# Patient Record
Sex: FEMALE | Race: BLACK OR AFRICAN AMERICAN | NOT HISPANIC OR LATINO | Employment: OTHER | URBAN - METROPOLITAN AREA
[De-identification: names, ages, dates, MRNs, and addresses within clinical notes are randomized per-mention and may not be internally consistent; named-entity substitution may affect disease eponyms.]

---

## 2017-04-03 ENCOUNTER — TELEPHONE (OUTPATIENT)
Dept: INTERNAL MEDICINE | Facility: CLINIC | Age: 67
End: 2017-04-03

## 2017-04-03 NOTE — TELEPHONE ENCOUNTER
Panel Management Review      Patient has the following on her problem list: None      Composite cancer screening  Chart review shows that this patient is due/due soon for the following Pap Smear and Mammogram  Summary:    Patient is due/failing the following:   MAMMOGRAM, PAP and PHYSICAL    Action needed:   Patient needs office visit for Annual Px.    Type of outreach:    Phone, left message for patient to call back.     Questions for provider review:    None                                                                                                                                    Alice Dickens MA       Chart routed to none .

## 2017-08-18 ENCOUNTER — RADIANT APPOINTMENT (OUTPATIENT)
Dept: GENERAL RADIOLOGY | Facility: CLINIC | Age: 67
End: 2017-08-18
Attending: NURSE PRACTITIONER

## 2017-08-18 ENCOUNTER — OFFICE VISIT (OUTPATIENT)
Dept: INTERNAL MEDICINE | Facility: CLINIC | Age: 67
End: 2017-08-18

## 2017-08-18 VITALS
TEMPERATURE: 97.9 F | WEIGHT: 194.6 LBS | SYSTOLIC BLOOD PRESSURE: 150 MMHG | HEART RATE: 67 BPM | HEIGHT: 68 IN | OXYGEN SATURATION: 97 % | BODY MASS INDEX: 29.49 KG/M2 | DIASTOLIC BLOOD PRESSURE: 70 MMHG

## 2017-08-18 DIAGNOSIS — M54.41 ACUTE BILATERAL LOW BACK PAIN WITH RIGHT-SIDED SCIATICA: ICD-10-CM

## 2017-08-18 DIAGNOSIS — M70.61 TROCHANTERIC BURSITIS OF RIGHT HIP: ICD-10-CM

## 2017-08-18 DIAGNOSIS — M54.41 ACUTE BILATERAL LOW BACK PAIN WITH RIGHT-SIDED SCIATICA: Primary | ICD-10-CM

## 2017-08-18 PROCEDURE — 72100 X-RAY EXAM L-S SPINE 2/3 VWS: CPT

## 2017-08-18 PROCEDURE — 99213 OFFICE O/P EST LOW 20 MIN: CPT | Performed by: NURSE PRACTITIONER

## 2017-08-18 RX ORDER — CYCLOBENZAPRINE HCL 10 MG
5-10 TABLET ORAL 3 TIMES DAILY PRN
Qty: 30 TABLET | Refills: 1 | Status: SHIPPED | OUTPATIENT
Start: 2017-08-18 | End: 2017-12-15

## 2017-08-18 RX ORDER — PREDNISONE 10 MG/1
TABLET ORAL
Qty: 18 TABLET | Refills: 0 | Status: SHIPPED | OUTPATIENT
Start: 2017-08-18 | End: 2017-12-15

## 2017-08-18 RX ORDER — HYDROCODONE BITARTRATE AND ACETAMINOPHEN 5; 325 MG/1; MG/1
1-2 TABLET ORAL EVERY 8 HOURS PRN
Qty: 15 TABLET | Refills: 0 | Status: SHIPPED | OUTPATIENT
Start: 2017-08-18 | End: 2017-12-15

## 2017-08-18 NOTE — PATIENT INSTRUCTIONS
X ray in [address]    Flexeril at bedtime for muscle relaxer   Do not drive for 5-6 hour after taking medication     Prednisone  take in morning with food   Take 3 tabs daily for 3 days  Take 2 tabs daily for 3 days   Take 1 tab daily for 3 days   Then stop    Vicodin every 8 hours if needed  for severe pain

## 2017-08-18 NOTE — MR AVS SNAPSHOT
After Visit Summary   8/18/2017    Concepcion Schumacher    MRN: [id number]           Patient Information     Date Of Birth          1950        Visit Information        Provider Department      8/18/2017 2:45 PM Diana Phillips APRN CNP; LANGUAGE [facility]        Today's Diagnoses     Acute bilateral low back pain with right-sided sciatica    -  1    Trochanteric bursitis of right hip          Care Instructions    X ray in [address]    Flexeril at bedtime for muscle relaxer   Do not drive for 5-6 hour after taking medication     Prednisone  take in morning with food   Take 3 tabs daily for 3 days  Take 2 tabs daily for 3 days   Take 1 tab daily for 3 days   Then stop    Vicodin every 8 hours if needed  for severe pain           Follow-ups after your visit        Future tests that were ordered for you today     Open Future Orders        Priority Expected Expires Ordered    XR Lumbar Spine 2/3 Views Routine 8/18/2017 8/18/2018 8/18/2017            Who to contact     If you have questions or need follow up information about today's clinic visit or your schedule please contact [facility] directly at [phone].  Normal or non-critical lab and imaging results will be communicated to you by [facility], letter or phone within 4 business days after the clinic has received the results. If you do not hear from us within 7 days, please contact the clinic through [employer] [facility] or phone. If you have a critical or abnormal lab result, we will notify you by phone as soon as possible.  Submit refill requests through [facility] or call your pharmacy and they will forward the refill request to us. Please allow 3 business days for your refill to be completed.          Additional Information About Your Visit        [facility] Information     [facility] lets you send messages to your doctor, view your test results, renew your prescriptions, schedule appointments and more. To sign up, go to  [email or web address] . Click on \"Log in\" on the left side of the screen, which will take you to the Welcome page. Then click on \"Sign up Now\" on the right side of the page.     You will be asked to enter the access code listed below, as well as some personal information. Please follow the directions to create your username and password.     Your access code is: [email or web address]  Expires: 2017  3:46 PM     Your access code will  in 90 days. If you need help or a new code, please call your [facility] clinic or [phone].        Care EveryWhere ID     This is your Care EveryWhere ID. This could be used by other organizations to access your [facility] medical records  [id number]        Your Vitals Were     Pulse Temperature Height Pulse Oximetry BMI (Body Mass Index)       67 97.9  F (36.6  C) (Oral) 5' 8\" (1.727 m) 97% 29.59 kg/m2        Blood Pressure from Last 3 Encounters:   17 150/70   16 156/81   16 145/75    Weight from Last 3 Encounters:   17 194 lb 9.6 oz (88.3 kg)   16 195 lb (88.5 kg)   16 196 lb (88.9 kg)                 Today's Medication Changes          These changes are accurate as of: 17  3:46 PM.  If you have any questions, ask your nurse or doctor.               Start taking these medicines.        Dose/Directions    cyclobenzaprine 10 MG tablet   Commonly known as:  FLEXERIL   Used for:  Acute bilateral low back pain with right-sided sciatica   Started by:  Diana Phillips APRN CNP        Dose:  5-10 mg   Take 0.5-1 tablets (5-10 mg) by mouth 3 times daily as needed for muscle spasms   Quantity:  30 tablet   Refills:  1       HYDROcodone-acetaminophen 5-325 MG per tablet   Commonly known as:  NORCO   Used for:  Acute bilateral low back pain with right-sided sciatica, Trochanteric bursitis of right hip   Started by:  Diana Phillips APRN CNP        Dose:  1-2 tablet   Take 1-2 tablets by mouth every 8 hours as needed for moderate to severe " pain   Quantity:  15 tablet   Refills:  0       predniSONE 10 MG tablet   Commonly known as:  DELTASONE   Used for:  Acute bilateral low back pain with right-sided sciatica, Trochanteric bursitis of right hip   Started by:  Diana Phillips APRN CNP        Take 3 tabs daily for 3 days Take 2 tabs daily for 3 days  Take 1 tab daily for 3 days  Then stop   Quantity:  18 tablet   Refills:  0            Where to get your medicines      These medications were sent to [place] [address] [place] [facility] [address] [place] [address] [address] [facility] [address] [facility] [address]     Phone:  [phone]     cyclobenzaprine 10 MG tablet         Some of these will need a paper prescription and others can be bought over the counter.  Ask your nurse if you have questions.     Bring a paper prescription for each of these medications     HYDROcodone-acetaminophen 5-325 MG per tablet    predniSONE 10 MG tablet                Primary Care Provider Office Phone # Fax #    HOLLY Sylvester [phone]       [address] [address]        Equal Access to Services     [facility]: Hadii sri ku hadasho Soomaali, waaxda luqadaha, qaybta kaalmada adeegyada, waxdominguez ornelas . So [facility] [place] [phone].    ATENCIÓN: Si habla español, tiene a chan disposición servicios gratuitos de asistencia lingüística. Llame al [phone].    We comply with applicable federal civil rights laws and [place] laws. We do not discriminate on the basis of race, color, national origin, age, disability sex, sexual orientation or gender identity.            Thank you!     Thank you for choosing [facility]  for your care. Our goal is always to provide you with excellent care. Hearing back from our patients is one way we can continue to improve our services. Please take a few minutes to complete the written survey that you may receive in the mail after your visit with us. Thank  you!             Your Updated Medication List - Protect others around you: Learn how to safely use, store and throw away your medicines at www.disposemymeds.org.          This list is accurate as of: 8/18/17  3:46 PM.  Always use your most recent med list.                   Brand Name Dispense Instructions for use Diagnosis    cyclobenzaprine 10 MG tablet    FLEXERIL    30 tablet    Take 0.5-1 tablets (5-10 mg) by mouth 3 times daily as needed for muscle spasms    Acute bilateral low back pain with right-sided sciatica       HYDROcodone-acetaminophen 5-325 MG per tablet    NORCO    15 tablet    Take 1-2 tablets by mouth every 8 hours as needed for moderate to severe pain    Acute bilateral low back pain with right-sided sciatica, Trochanteric bursitis of right hip       predniSONE 10 MG tablet    DELTASONE    18 tablet    Take 3 tabs daily for 3 days Take 2 tabs daily for 3 days  Take 1 tab daily for 3 days  Then stop    Acute bilateral low back pain with right-sided sciatica, Trochanteric bursitis of right hip

## 2017-08-18 NOTE — NURSING NOTE
"Chief Complaint   Patient presents with     Musculoskeletal Problem       Initial /70  Pulse 67  Temp 97.9  F (36.6  C) (Oral)  Ht 5' 8\" (1.727 m)  Wt 194 lb 9.6 oz (88.3 kg)  SpO2 97%  BMI 29.59 kg/m2 Estimated body mass index is 29.59 kg/(m^2) as calculated from the following:    Height as of this encounter: 5' 8\" (1.727 m).    Weight as of this encounter: 194 lb 9.6 oz (88.3 kg).  Medication Reconciliation: complete    "

## 2017-08-18 NOTE — PROGRESS NOTES
"  SUBJECTIVE:   Concepcion Schumacher is a 67 year old female who presents to clinic today for the following health issues:      Patient here with right foot pain for two week.  Daughter here and  for her   Patient does need  to be seen but provider feels she does need  as daughter has to explain much of questions and answers         Problem list and histories reviewed & adjusted, as indicated.  Additional history:     Patient Active Problem List   Diagnosis     Hypercalcemia     Primary hyperparathyroidism (H)     Hyperparathyroidism (H)     CARDIOVASCULAR SCREENING; LDL GOAL LESS THAN 160     Mantoux: positive     Nausea and vomiting     Past Surgical History:   Procedure Laterality Date     PARATHYROIDECTOMY  7/3/2012    Procedure: PARATHYROIDECTOMY;  Neck exploration with Excision parathyroid adenoma, with rapid PTH assay, preoperative sestimeby injection;  Surgeon: Jaclyn Sanchez MD;  Location:  OR       Social History   Substance Use Topics     Smoking status: Never Smoker     Smokeless tobacco: Never Used     Alcohol use No     Family History   Problem Relation Age of Onset     Hypertension Sister      Family History Negative Mother      Family History Negative Father              Reviewed and updated as needed this visit by clinical staffTobacco  Allergies  Meds  Problems  Med Hx  Surg Hx  Fam Hx  Soc Hx        Reviewed and updated as needed this visit by Provider         ROS:  Constitutional, HEENT, cardiovascular, pulmonary, GI, , musculoskeletal, neuro, skin, endocrine and psych systems are negative, except as otherwise noted.      OBJECTIVE:   /70  Pulse 67  Temp 97.9  F (36.6  C) (Oral)  Ht 5' 8\" (1.727 m)  Wt 194 lb 9.6 oz (88.3 kg)  SpO2 97%  BMI 29.59 kg/m2  Body mass index is 29.59 kg/(m^2).  GENERAL: alert and mild distress with back to foot pain ; here with daughter who interprets- she does need    RESP: lungs clear  CV: regular rate " and rhythm  MS: no gross musculoskeletal defects noted, no edema  Pain in lower lumbar area - paraspinal muscle tight   Also has pain with compression trochanteric bursa and causes pain around hip and down leg   Antalgic gait   NEURO: Normal strength and tone- limping with walk , mentation intact and speech normal  PSYCH: mentation appears normal, affect normal/bright    Diagnostic Test Results:  X ray     ASSESSMENT/PLAN:             1. Acute bilateral low back pain with right-sided sciatica    - XR Lumbar Spine 2/3 Views; Future  - predniSONE (DELTASONE) 10 MG tablet; Take 3 tabs daily for 3 days  Take 2 tabs daily for 3 days   Take 1 tab daily for 3 days   Then stop  Dispense: 18 tablet; Refill: 0  - cyclobenzaprine (FLEXERIL) 10 MG tablet; Take 0.5-1 tablets (5-10 mg) by mouth 3 times daily as needed for muscle spasms  Dispense: 30 tablet; Refill: 1  - HYDROcodone-acetaminophen (NORCO) 5-325 MG per tablet; Take 1-2 tablets by mouth every 8 hours as needed for moderate to severe pain  Dispense: 15 tablet; Refill: 0    2. Trochanteric bursitis of right hip    - XR Lumbar Spine 2/3 Views; Future  - predniSONE (DELTASONE) 10 MG tablet; Take 3 tabs daily for 3 days  Take 2 tabs daily for 3 days   Take 1 tab daily for 3 days   Then stop  Dispense: 18 tablet; Refill: 0  - HYDROcodone-acetaminophen (NORCO) 5-325 MG per tablet; Take 1-2 tablets by mouth every 8 hours as needed for moderate to severe pain  Dispense: 15 tablet; Refill: 0    Patient Instructions   X ray in [address]    Flexeril at bedtime for muscle relaxer   Do not drive for 5-6 hour after taking medication     Prednisone  take in morning with food   Take 3 tabs daily for 3 days  Take 2 tabs daily for 3 days   Take 1 tab daily for 3 days   Then stop    Vicodin every 8 hours if needed  for severe pain       HOLLY Sylvester [facility]

## 2017-10-15 ENCOUNTER — HEALTH MAINTENANCE LETTER (OUTPATIENT)
Age: 67
End: 2017-10-15

## 2017-12-15 ENCOUNTER — TRANSFERRED RECORDS (OUTPATIENT)
Dept: HEALTH INFORMATION MANAGEMENT | Facility: CLINIC | Age: 67
End: 2017-12-15

## 2017-12-15 ENCOUNTER — OFFICE VISIT (OUTPATIENT)
Dept: INTERNAL MEDICINE | Facility: CLINIC | Age: 67
End: 2017-12-15

## 2017-12-15 ENCOUNTER — HOSPITAL ENCOUNTER (OUTPATIENT)
Dept: GENERAL RADIOLOGY | Facility: CLINIC | Age: 67
Discharge: HOME | End: 2017-12-15
Attending: NURSE PRACTITIONER | Admitting: NURSE PRACTITIONER

## 2017-12-15 VITALS
DIASTOLIC BLOOD PRESSURE: 58 MMHG | BODY MASS INDEX: 29.81 KG/M2 | OXYGEN SATURATION: 98 % | WEIGHT: 196.7 LBS | SYSTOLIC BLOOD PRESSURE: 110 MMHG | TEMPERATURE: 98.1 F | HEART RATE: 97 BPM | HEIGHT: 68 IN

## 2017-12-15 DIAGNOSIS — M54.50 LEFT-SIDED LOW BACK PAIN WITHOUT SCIATICA, UNSPECIFIED CHRONICITY: ICD-10-CM

## 2017-12-15 DIAGNOSIS — R04.2 COUGHING BLOOD: ICD-10-CM

## 2017-12-15 DIAGNOSIS — J06.9 UPPER RESPIRATORY TRACT INFECTION, UNSPECIFIED TYPE: ICD-10-CM

## 2017-12-15 DIAGNOSIS — R82.90 NONSPECIFIC FINDING ON EXAMINATION OF URINE: Primary | ICD-10-CM

## 2017-12-15 DIAGNOSIS — R05.9 COUGH: ICD-10-CM

## 2017-12-15 DIAGNOSIS — R06.89 DECREASED BREATH SOUNDS: ICD-10-CM

## 2017-12-15 DIAGNOSIS — R05.9 COUGH: Primary | ICD-10-CM

## 2017-12-15 DIAGNOSIS — M79.645 PAIN OF FINGER OF LEFT HAND: ICD-10-CM

## 2017-12-15 DIAGNOSIS — S46.812A TRAPEZIUS STRAIN, LEFT, INITIAL ENCOUNTER: ICD-10-CM

## 2017-12-15 LAB
ALBUMIN UR-MCNC: 30 MG/DL
APPEARANCE UR: ABNORMAL
BACTERIA #/AREA URNS HPF: ABNORMAL /HPF
BASOPHILS # BLD AUTO: 0 10E9/L (ref 0–0.2)
BASOPHILS NFR BLD AUTO: 0.2 %
BILIRUB UR QL STRIP: NEGATIVE
COLOR UR AUTO: YELLOW
DIFFERENTIAL METHOD BLD: NORMAL
EOSINOPHIL # BLD AUTO: 0.1 10E9/L (ref 0–0.7)
EOSINOPHIL NFR BLD AUTO: 1.1 %
ERYTHROCYTE [DISTWIDTH] IN BLOOD BY AUTOMATED COUNT: 13.6 % (ref 10–15)
GLUCOSE UR STRIP-MCNC: NEGATIVE MG/DL
HCT VFR BLD AUTO: 42.4 % (ref 35–47)
HGB BLD-MCNC: 13.6 G/DL (ref 11.7–15.7)
HGB UR QL STRIP: ABNORMAL
KETONES UR STRIP-MCNC: 15 MG/DL
LEUKOCYTE ESTERASE UR QL STRIP: ABNORMAL
LYMPHOCYTES # BLD AUTO: 2.7 10E9/L (ref 0.8–5.3)
LYMPHOCYTES NFR BLD AUTO: 29.3 %
MCH RBC QN AUTO: 27.8 PG (ref 26.5–33)
MCHC RBC AUTO-ENTMCNC: 32.1 G/DL (ref 31.5–36.5)
MCV RBC AUTO: 87 FL (ref 78–100)
MONOCYTES # BLD AUTO: 0.8 10E9/L (ref 0–1.3)
MONOCYTES NFR BLD AUTO: 8.1 %
NEUTROPHILS # BLD AUTO: 5.7 10E9/L (ref 1.6–8.3)
NEUTROPHILS NFR BLD AUTO: 61.3 %
NITRATE UR QL: NEGATIVE
NON-SQ EPI CELLS #/AREA URNS LPF: ABNORMAL /LPF
PH UR STRIP: 6.5 PH (ref 5–7)
PLATELET # BLD AUTO: 248 10E9/L (ref 150–450)
RBC # BLD AUTO: 4.89 10E12/L (ref 3.8–5.2)
RBC #/AREA URNS AUTO: ABNORMAL /HPF
SOURCE: ABNORMAL
SP GR UR STRIP: 1.02 (ref 1–1.03)
UROBILINOGEN UR STRIP-ACNC: 0.2 EU/DL (ref 0.2–1)
WBC # BLD AUTO: 9.3 10E9/L (ref 4–11)
WBC #/AREA URNS AUTO: >100 /HPF

## 2017-12-15 PROCEDURE — 80053 COMPREHEN METABOLIC PANEL: CPT | Performed by: NURSE PRACTITIONER

## 2017-12-15 PROCEDURE — 36415 COLL VENOUS BLD VENIPUNCTURE: CPT | Performed by: NURSE PRACTITIONER

## 2017-12-15 PROCEDURE — 81001 URINALYSIS AUTO W/SCOPE: CPT | Performed by: NURSE PRACTITIONER

## 2017-12-15 PROCEDURE — 86480 TB TEST CELL IMMUN MEASURE: CPT | Performed by: NURSE PRACTITIONER

## 2017-12-15 PROCEDURE — 85025 COMPLETE CBC W/AUTO DIFF WBC: CPT | Performed by: NURSE PRACTITIONER

## 2017-12-15 PROCEDURE — 87086 URINE CULTURE/COLONY COUNT: CPT | Performed by: INTERNAL MEDICINE

## 2017-12-15 PROCEDURE — 71020 XR CHEST 2 VW: CPT

## 2017-12-15 PROCEDURE — 99215 OFFICE O/P EST HI 40 MIN: CPT | Performed by: NURSE PRACTITIONER

## 2017-12-15 RX ORDER — CYCLOBENZAPRINE HCL 10 MG
5-10 TABLET ORAL 3 TIMES DAILY PRN
Qty: 30 TABLET | Refills: 1 | Status: SHIPPED | OUTPATIENT
Start: 2017-12-15 | End: 2018-11-01

## 2017-12-15 RX ORDER — CEFDINIR 300 MG/1
300 CAPSULE ORAL 2 TIMES DAILY
Qty: 20 CAPSULE | Refills: 0 | Status: SHIPPED | OUTPATIENT
Start: 2017-12-15 | End: 2018-11-01

## 2017-12-15 RX ORDER — PREDNISONE 10 MG/1
TABLET ORAL
Qty: 18 TABLET | Refills: 0 | Status: SHIPPED | OUTPATIENT
Start: 2017-12-15 | End: 2018-11-01

## 2017-12-15 NOTE — NURSING NOTE
"Chief Complaint   Patient presents with     Cough     Patient here for persistent cough. Had neck surgery 2013. Pain on Rt hand for a while now       Initial /58 (BP Location: Left arm, Patient Position: Sitting, Cuff Size: Adult Regular)  Pulse 97  Temp 98.1  F (36.7  C) (Oral)  Ht 5' 8\" (1.727 m)  Wt 196 lb 11.2 oz (89.2 kg)  SpO2 98%  Breastfeeding? No  BMI 29.91 kg/m2 Estimated body mass index is 29.91 kg/(m^2) as calculated from the following:    Height as of this encounter: 5' 8\" (1.727 m).    Weight as of this encounter: 196 lb 11.2 oz (89.2 kg).  Medication Reconciliation: complete   Pedro Robles MA    "

## 2017-12-15 NOTE — PROGRESS NOTES
SUBJECTIVE:   Concepcion Schumacher is a 67 year old female who presents to clinic today for the following health issues:    Patient here for persistent cough. Cough for a weeks   Occasionally  productive - not color change   Cough keeping up at night   SOB at times   No fever   pain in left side of neck   No ear or sore throat  Bad breath     Coughs up blood in morning at times      No asthma   Had neck surgery 2013.     Pain on left hand for a while now   pain in hand and when  Is hurts seems to come from her neck area   Can happen any time of day   No injury   Thyroid surgery 2013    [facility] - here since 2001          Problem list and histories reviewed & adjusted, as indicated.  Additional history: as documented    Patient Active Problem List   Diagnosis     Hypercalcemia     Primary hyperparathyroidism (H)     Hyperparathyroidism (H)     CARDIOVASCULAR SCREENING; LDL GOAL LESS THAN 160     Mantoux: positive     Nausea and vomiting     Past Surgical History:   Procedure Laterality Date     PARATHYROIDECTOMY  7/3/2012    Procedure: PARATHYROIDECTOMY;  Neck exploration with Excision parathyroid adenoma, with rapid PTH assay, preoperative sestimeby injection;  Surgeon: Jaclyn Sanchez MD;  Location:  OR       Social History   Substance Use Topics     Smoking status: Never Smoker     Smokeless tobacco: Never Used     Alcohol use No     Family History   Problem Relation Age of Onset     Hypertension Sister      Family History Negative Mother      Family History Negative Father              Reviewed and updated as needed this visit by clinical staffTobacco  Allergies  Meds  Med Hx  Surg Hx  Fam Hx  Soc Hx      Reviewed and updated as needed this visit by Provider         ROS:  Constitutional, HEENT, cardiovascular, pulmonary, GI, , musculoskeletal, neuro, skin, endocrine and psych systems are negative, except as otherwise noted.      OBJECTIVE:   /58 (BP Location: Left arm, Patient Position: Sitting,  "Cuff Size: Adult Regular)  Pulse 97  Temp 98.1  F (36.7  C) (Oral)  Ht 5' 8\" (1.727 m)  Wt 196 lb 11.2 oz (89.2 kg)  SpO2 98%  Breastfeeding? No  BMI 29.91 kg/m2  Body mass index is 29.91 kg/(m^2).  GENERAL: alert and no distress- daughter  in room   HENT: ear canals and TM's normal, nose and mouth without ulcers or lesions  RESP: lungs clear to auscultation - no rales, rhonchi or wheezes  CV: regular rate and rhythm  MS: no gross musculoskeletal defects noted, no edema; pain in left trapezius muscle and compression of same causing pain into left arm   NEURO: Normal strength and tone, mentation intact and speech normal  PSYCH: mentation appears normal, affect normal/bright    Diagnostic Test Results:  lab  Chest x ray - awaiting radiology read   ASSESSMENT/PLAN:             1. Cough  Previous negative TB - says there is blood in sputum - although first said sputum was clear and was not coughing much up at all   Interpreted through daughter in room  - M Tuberculosis by Quantiferon  - XR Chest 2 Views; Future    2. Upper respiratory tract infection, unspecified type    - M Tuberculosis by Quantiferon  - CBC with platelets differential  - Comprehensive metabolic panel    3. Left-sided low back pain without sciatica, unspecified chronicity    - UA reflex to Microscopic and Culture  - predniSONE (DELTASONE) 10 MG tablet; Take 3 tabs daily for 3 days  Take 2 tabs daily for 3 days   Take 1 tab daily for 3 days   Then stop  Dispense: 18 tablet; Refill: 0    4. Coughing blood    - XR Chest 2 Views; Future    5. Pain  left hand  Related to tight trapezius muscle   - cyclobenzaprine (FLEXERIL) 10 MG tablet; Take 0.5-1 tablets (5-10 mg) by mouth 3 times daily as needed for muscle spasms  Dispense: 30 tablet; Refill: 1  - predniSONE (DELTASONE) 10 MG tablet; Take 3 tabs daily for 3 days  Take 2 tabs daily for 3 days   Take 1 tab daily for 3 days   Then stop  Dispense: 18 tablet; Refill: 0    6. Trapezius " strain, left, initial encounter    - cyclobenzaprine (FLEXERIL) 10 MG tablet; Take 0.5-1 tablets (5-10 mg) by mouth 3 times daily as needed for muscle spasms  Dispense: 30 tablet; Refill: 1  - predniSONE (DELTASONE) 10 MG tablet; Take 3 tabs daily for 3 days  Take 2 tabs daily for 3 days   Take 1 tab daily for 3 days   Then stop  Dispense: 18 tablet; Refill: 0    7. Decreased breath sounds  Chest xray   - predniSONE (DELTASONE) 10 MG tablet; Take 3 tabs daily for 3 days  Take 2 tabs daily for 3 days   Take 1 tab daily for 3 days   Then stop  Dispense: 18 tablet; Refill: 0  - cefdinir (OMNICEF) 300 MG capsule; Take 1 capsule (300 mg) by mouth 2 times daily  Dispense: 20 capsule; Refill: 0    Patient Instructions   Lab in [address]    Chest x ray in [address]    Flexeril at bedtime for left arm pain and shoulder pain     omnicef twice daily for 10 days     Prednisone  Take in morning with food   Take 3 tabs daily for 3 days  Take 2 tabs daily for 3 days   Take 1 tab daily for 3 days   Then stop     Follow up with any questions or concerns.         HOLLY Sylvester [facility]

## 2017-12-15 NOTE — NURSING NOTE
"Chief Complaint   Patient presents with     Cough     Patient here for persistent cough. Had surgery neck 2013. Pain on Rt hand for a while       Initial /58 (BP Location: Left arm, Patient Position: Sitting, Cuff Size: Adult Regular)  Pulse 97  Temp 98.1  F (36.7  C) (Oral)  Ht 5' 8\" (1.727 m)  Wt 196 lb 11.2 oz (89.2 kg)  SpO2 98%  Breastfeeding? No  BMI 29.91 kg/m2 Estimated body mass index is 29.91 kg/(m^2) as calculated from the following:    Height as of this encounter: 5' 8\" (1.727 m).    Weight as of this encounter: 196 lb 11.2 oz (89.2 kg).  Medication Reconciliation: complete   Pedro Robles MA    "

## 2017-12-15 NOTE — MR AVS SNAPSHOT
After Visit Summary   12/15/2017    Concepcion Schumacher    MRN: [id number]           Patient Information     Date Of Birth          1950        Visit Information        Provider Department      12/15/2017 8:40 AM Diana Phillips APRN CNP [facility]        Today's Diagnoses     Cough    -  1    Upper respiratory tract infection, unspecified type        Left-sided low back pain without sciatica, unspecified chronicity        Coughing blood        Pain of finger of left hand        Trapezius strain, left, initial encounter        Decreased breath sounds          Care Instructions    Lab in [address]    Chest x ray in [address]    Flexeril at bedtime for left arm pain and shoulder pain     omnicef twice daily for 10 days     Prednisone  Take in morning with food   Take 3 tabs daily for 3 days  Take 2 tabs daily for 3 days   Take 1 tab daily for 3 days   Then stop     Follow up with any questions or concerns.             Follow-ups after your visit        Future tests that were ordered for you today     Open Future Orders        Priority Expected Expires Ordered    XR Chest 2 Views Routine 12/15/2017 12/15/2018 12/15/2017            Who to contact     If you have questions or need follow up information about today's clinic visit or your schedule please contact [facility] directly at [phone].  Normal or non-critical lab and imaging results will be communicated to you by MyChart, letter or phone within 4 business days after the clinic has received the results. If you do not hear from us within 7 days, please contact the clinic through MyChart or phone. If you have a critical or abnormal lab result, we will notify you by phone as soon as possible.  Submit refill requests through [facility] or call your pharmacy and they will forward the refill request to us. Please allow 3 business days for your refill to be completed.          Additional Information About Your Visit       "  MyChart Information     [email or web address] lets you send messages to your doctor, view your test results, renew your prescriptions, schedule appointments and more. To sign up, go to [email or web address] . Click on \"Log in\" on the left side of the screen, which will take you to the Welcome page. Then click on \"Sign up Now\" on the right side of the page.     You will be asked to enter the access code listed below, as well as some personal information. Please follow the directions to create your username and password.     Your access code is: [email or web address]  Expires: 3/15/2018  9:40 AM     Your access code will  in 90 days. If you need help or a new code, please call your [place] clinic or [phone].        Care EveryWhere ID     This is your Care EveryWhere ID. This could be used by other organizations to access your [place] medical records  [id number]        Your Vitals Were     Pulse Temperature Height Pulse Oximetry Breastfeeding? BMI (Body Mass Index)    97 98.1  F (36.7  C) (Oral) 5' 8\" (1.727 m) 98% No 29.91 kg/m2       Blood Pressure from Last 3 Encounters:   12/15/17 110/58   17 150/70   16 156/81    Weight from Last 3 Encounters:   12/15/17 196 lb 11.2 oz (89.2 kg)   17 194 lb 9.6 oz (88.3 kg)   16 195 lb (88.5 kg)              We Performed the Following     CBC with platelets differential     Comprehensive metabolic panel     M Tuberculosis by Quantiferon     UA reflex to Microscopic and Culture          Today's Medication Changes          These changes are accurate as of: 12/15/17  9:40 AM.  If you have any questions, ask your nurse or doctor.               Start taking these medicines.        Dose/Directions    cefdinir 300 MG capsule   Commonly known as:  OMNICEF   Used for:  Decreased breath sounds   Started by:  Diana Phillips APRN CNP        Dose:  300 mg   Take 1 capsule (300 mg) by mouth 2 times daily   Quantity:  20 capsule   Refills:  0         Stop taking these " medicines if you haven't already. Please contact your care team if you have questions.     HYDROcodone-acetaminophen 5-325 MG per tablet   Commonly known as:  NORCO   Stopped by:  Diana Phillips APRN CNP                Where to get your medicines      These medications were sent to [id number] - [facility] [address] AT [facility] [address]  [address], [facility] [address]     Phone:  [phone]     cefdinir 300 MG capsule    cyclobenzaprine 10 MG tablet         Some of these will need a paper prescription and others can be bought over the counter.  Ask your nurse if you have questions.     Bring a paper prescription for each of these medications     predniSONE 10 MG tablet                Primary Care Provider Office Phone # Fax #    HOLLY Sylvester [phone]       [address] [facility] [address]        Equal Access to Services     MINNIE SANTIAGO : Hadii aad ku hadasho Soomaali, waaxda luqadaha, qaybta kaalmada adeegyada, waxay idiin hayaan inge ornelas . So [facility] [phone].    ATENCIÓN: Si habla español, tiene a chan disposición servicios gratuitos de asistencia lingüística. Ll[facility] [phone].    We comply with applicable federal civil rights laws and [place] laws. We do not discriminate on the basis of race, color, national origin, age, disability, sex, sexual orientation, or gender identity.            Thank you!     Thank you for choosing [facility]  for your care. Our goal is always to provide you with excellent care. Hearing back from our patients is one way we can continue to improve our services. Please take a few minutes to complete the written survey that you may receive in the mail after your visit with us. Thank you!             Your Updated Medication List - Protect others around you: Learn how to safely use, store and throw away your medicines at www.disposemymeds.org.           This list is accurate as of: 12/15/17  9:40 AM.  Always use your most recent med list.                   Brand Name Dispense Instructions for use Diagnosis    cefdinir 300 MG capsule    OMNICEF    20 capsule    Take 1 capsule (300 mg) by mouth 2 times daily    Decreased breath sounds       cyclobenzaprine 10 MG tablet    FLEXERIL    30 tablet    Take 0.5-1 tablets (5-10 mg) by mouth 3 times daily as needed for muscle spasms    Pain of finger of left hand, Trapezius strain, left, initial encounter       predniSONE 10 MG tablet    DELTASONE    18 tablet    Take 3 tabs daily for 3 days Take 2 tabs daily for 3 days  Take 1 tab daily for 3 days  Then stop    Left-sided low back pain without sciatica, unspecified chronicity, Pain of finger of left hand, Trapezius strain, left, initial encounter, Decreased breath sounds

## 2017-12-16 ENCOUNTER — HOSPITAL ENCOUNTER (EMERGENCY)
Facility: CLINIC | Age: 67
Discharge: HOME | End: 2017-12-16
Attending: EMERGENCY MEDICINE | Admitting: EMERGENCY MEDICINE

## 2017-12-16 ENCOUNTER — NURSE TRIAGE (OUTPATIENT)
Dept: NURSING | Facility: CLINIC | Age: 67
End: 2017-12-16

## 2017-12-16 VITALS
OXYGEN SATURATION: 98 % | HEART RATE: 75 BPM | SYSTOLIC BLOOD PRESSURE: 164 MMHG | TEMPERATURE: 97.2 F | RESPIRATION RATE: 16 BRPM | DIASTOLIC BLOOD PRESSURE: 97 MMHG

## 2017-12-16 DIAGNOSIS — R20.9 SENSATION DISTURBANCE OF SKIN: ICD-10-CM

## 2017-12-16 LAB
ALBUMIN SERPL-MCNC: 3.2 G/DL (ref 3.4–5)
ALP SERPL-CCNC: 80 U/L (ref 40–150)
ALT SERPL W P-5'-P-CCNC: 17 U/L (ref 0–50)
ANION GAP SERPL CALCULATED.3IONS-SCNC: 4 MMOL/L (ref 3–14)
ANION GAP SERPL CALCULATED.3IONS-SCNC: 9 MMOL/L (ref 3–14)
AST SERPL W P-5'-P-CCNC: 18 U/L (ref 0–45)
BACTERIA SPEC CULT: NORMAL
BACTERIA SPEC CULT: NORMAL
BASOPHILS # BLD AUTO: 0 10E9/L (ref 0–0.2)
BASOPHILS NFR BLD AUTO: 0.3 %
BILIRUB SERPL-MCNC: 0.7 MG/DL (ref 0.2–1.3)
BUN SERPL-MCNC: 6 MG/DL (ref 7–30)
BUN SERPL-MCNC: 9 MG/DL (ref 7–30)
CALCIUM SERPL-MCNC: 8.4 MG/DL (ref 8.5–10.1)
CALCIUM SERPL-MCNC: 9.1 MG/DL (ref 8.5–10.1)
CHLORIDE SERPL-SCNC: 100 MMOL/L (ref 94–109)
CHLORIDE SERPL-SCNC: 102 MMOL/L (ref 94–109)
CO2 SERPL-SCNC: 25 MMOL/L (ref 20–32)
CO2 SERPL-SCNC: 31 MMOL/L (ref 20–32)
CREAT SERPL-MCNC: 0.71 MG/DL (ref 0.52–1.04)
CREAT SERPL-MCNC: 0.77 MG/DL (ref 0.52–1.04)
DIFFERENTIAL METHOD BLD: NORMAL
EOSINOPHIL # BLD AUTO: 0 10E9/L (ref 0–0.7)
EOSINOPHIL NFR BLD AUTO: 0.1 %
ERYTHROCYTE [DISTWIDTH] IN BLOOD BY AUTOMATED COUNT: 12.8 % (ref 10–15)
GFR SERPL CREATININE-BSD FRML MDRD: 75 ML/MIN/1.7M2
GFR SERPL CREATININE-BSD FRML MDRD: 82 ML/MIN/1.7M2
GLUCOSE SERPL-MCNC: 114 MG/DL (ref 70–99)
GLUCOSE SERPL-MCNC: 114 MG/DL (ref 70–99)
HCT VFR BLD AUTO: 41.4 % (ref 35–47)
HGB BLD-MCNC: 13.8 G/DL (ref 11.7–15.7)
IMM GRANULOCYTES # BLD: 0.1 10E9/L (ref 0–0.4)
IMM GRANULOCYTES NFR BLD: 0.7 %
LYMPHOCYTES # BLD AUTO: 1.5 10E9/L (ref 0.8–5.3)
LYMPHOCYTES NFR BLD AUTO: 16.7 %
MCH RBC QN AUTO: 28.2 PG (ref 26.5–33)
MCHC RBC AUTO-ENTMCNC: 33.3 G/DL (ref 31.5–36.5)
MCV RBC AUTO: 85 FL (ref 78–100)
MONOCYTES # BLD AUTO: 0.5 10E9/L (ref 0–1.3)
MONOCYTES NFR BLD AUTO: 5.6 %
NEUTROPHILS # BLD AUTO: 7.1 10E9/L (ref 1.6–8.3)
NEUTROPHILS NFR BLD AUTO: 76.6 %
NRBC # BLD AUTO: 0 10*3/UL
NRBC BLD AUTO-RTO: 0 /100
PLATELET # BLD AUTO: 267 10E9/L (ref 150–450)
POTASSIUM SERPL-SCNC: 3.6 MMOL/L (ref 3.4–5.3)
POTASSIUM SERPL-SCNC: 3.8 MMOL/L (ref 3.4–5.3)
PROT SERPL-MCNC: 8.8 G/DL (ref 6.8–8.8)
RBC # BLD AUTO: 4.9 10E12/L (ref 3.8–5.2)
SODIUM SERPL-SCNC: 134 MMOL/L (ref 133–144)
SODIUM SERPL-SCNC: 137 MMOL/L (ref 133–144)
SPECIMEN SOURCE: NORMAL
TROPONIN I SERPL-MCNC: <0.015 UG/L (ref 0–0.04)
WBC # BLD AUTO: 9.2 10E9/L (ref 4–11)

## 2017-12-16 PROCEDURE — 96375 TX/PRO/DX INJ NEW DRUG ADDON: CPT

## 2017-12-16 PROCEDURE — 93005 ELECTROCARDIOGRAM TRACING: CPT

## 2017-12-16 PROCEDURE — 84484 ASSAY OF TROPONIN QUANT: CPT | Performed by: EMERGENCY MEDICINE

## 2017-12-16 PROCEDURE — 85025 COMPLETE CBC W/AUTO DIFF WBC: CPT | Performed by: EMERGENCY MEDICINE

## 2017-12-16 PROCEDURE — 99285 EMERGENCY DEPT VISIT HI MDM: CPT | Mod: 25

## 2017-12-16 PROCEDURE — 25000128 H RX IP 250 OP 636: Performed by: EMERGENCY MEDICINE

## 2017-12-16 PROCEDURE — 96374 THER/PROPH/DIAG INJ IV PUSH: CPT

## 2017-12-16 PROCEDURE — 96376 TX/PRO/DX INJ SAME DRUG ADON: CPT

## 2017-12-16 PROCEDURE — 25000132 ZZH RX MED GY IP 250 OP 250 PS 637: Performed by: EMERGENCY MEDICINE

## 2017-12-16 PROCEDURE — 80048 BASIC METABOLIC PNL TOTAL CA: CPT | Performed by: EMERGENCY MEDICINE

## 2017-12-16 RX ORDER — DIPHENHYDRAMINE HYDROCHLORIDE 50 MG/ML
25 INJECTION INTRAMUSCULAR; INTRAVENOUS ONCE
Status: COMPLETED | OUTPATIENT
Start: 2017-12-16 | End: 2017-12-16

## 2017-12-16 RX ORDER — OXYCODONE HYDROCHLORIDE 5 MG/1
10 TABLET ORAL ONCE
Status: COMPLETED | OUTPATIENT
Start: 2017-12-16 | End: 2017-12-16

## 2017-12-16 RX ORDER — DOXYCYCLINE 100 MG/1
100 CAPSULE ORAL 2 TIMES DAILY
Qty: 14 CAPSULE | Refills: 0 | Status: SHIPPED | OUTPATIENT
Start: 2017-12-16 | End: 2018-11-01

## 2017-12-16 RX ORDER — HYDROXYZINE HYDROCHLORIDE 25 MG/1
25-50 TABLET, FILM COATED ORAL EVERY 6 HOURS PRN
Qty: 60 TABLET | Refills: 1 | Status: SHIPPED | OUTPATIENT
Start: 2017-12-16 | End: 2018-11-01

## 2017-12-16 RX ORDER — LORAZEPAM 2 MG/ML
0.5 INJECTION INTRAMUSCULAR ONCE
Status: COMPLETED | OUTPATIENT
Start: 2017-12-16 | End: 2017-12-16

## 2017-12-16 RX ADMIN — OXYCODONE HYDROCHLORIDE 10 MG: 5 TABLET ORAL at 14:57

## 2017-12-16 RX ADMIN — DIPHENHYDRAMINE HYDROCHLORIDE 25 MG: 50 INJECTION, SOLUTION INTRAMUSCULAR; INTRAVENOUS at 11:35

## 2017-12-16 RX ADMIN — DIPHENHYDRAMINE HYDROCHLORIDE 25 MG: 50 INJECTION, SOLUTION INTRAMUSCULAR; INTRAVENOUS at 12:23

## 2017-12-16 RX ADMIN — LORAZEPAM 0.5 MG: 2 INJECTION, SOLUTION INTRAMUSCULAR; INTRAVENOUS at 13:10

## 2017-12-16 ASSESSMENT — ENCOUNTER SYMPTOMS
NAUSEA: 0
CHOKING: 0
ROS SKIN COMMENTS: DIFFUSE ITCHING
COUGH: 0
SORE THROAT: 1
TROUBLE SWALLOWING: 0
VOMITING: 0

## 2017-12-16 NOTE — ED PROVIDER NOTES
"  History     Chief Complaint:  Allergic Reaction    History limited due to language barrier and subsequently provided by family at bedside.   HPI   Concepcion Schumacher is a 67 year old female who presents to the emergency department today for evaluation of an allergic reaction. The patient was seen in clinic yesterday for evaluation of a sore throat and a non-bloody cough at which time an x-ray was performed and she was started on cefdinir for possible pneumonia. The patient took the cefdinir yesterday after her clinic visit, and last took it around 0830 this morning. Additionally the patient did take her prescribed 10mg of prednisone around 100, 2 hours prior to arrival. However the patient was extremely uncomfortable throughout the night last night and this morning, complaining of diffuse itching over her entire body. The patient states it feels as though she is \"burning\". Patient denies any rash however. Patient denies any difficulty swallowing or breathing. Patient and her family member deny similar reaction to medication in the past. Patient denies any nausea, vomiting, or cough currently. Patient has no history of blood clots.     Allergies:  Cefdinir   Hydrocodone      Medications:    cyclobenzaprine (FLEXERIL) 10 MG tablet   predniSONE (DELTASONE) 10 MG tablet   cefdinir (OMNICEF) 300 MG capsule     Past Medical History:    Hyperparathyroidism  Thyroid disease     Past Surgical History:    Parathyroidectomy     Family History:    Hypertension     Social History:  The patient was accompanied to the ED by family.  Smoking Status: Never smoker  Smokeless Tobacco: Never used   Alcohol Use: No    Marital Status:          Review of Systems   HENT: Positive for sore throat. Negative for trouble swallowing.    Respiratory: Negative for cough and choking.    Gastrointestinal: Negative for nausea and vomiting.   Skin: Negative for rash.        Diffuse itching     All other systems reviewed and are " negative.    Physical Exam     Patient Vitals for the past 24 hrs:   BP Temp Temp src Pulse Resp SpO2   12/16/17 1430 (!) 179/167 - - - - 95 %   12/16/17 1417 - - - - - 99 %   12/16/17 1329 - - - - - 98 %   12/16/17 1316 - - - - - 96 %   12/16/17 1315 (!) 186/96 - - - - -   12/16/17 1128 184/86 97.2  F (36.2  C) Temporal 75 28 99 %      Physical Exam  Constitutional: Well developed, appears uncomfortable and anxious.  Head: Atraumatic.   Mouth/Throat: Oropharynx is clear and moist.   Neck:  no stridor  Eyes: no scleral icterus  Cardiovascular: RRR, 2+ bilat radial and DP pulses  Pulmonary/Chest: nml resp effort, Clear BS bilat  Abdominal: ND, +BS, soft, NT, no rebound or guarding   : no CVA tenderness bilat  Ext: Warm, well perfused, no edema  Neurological: A&O, symmetric facies, moves ext x4  Skin: Skin is warm and dry.  No rash.  Psychiatric: Behavior is normal. Thought content normal.   Nursing note and vitals reviewed.     Emergency Department Course     ECG:  Indication: Possible allergic reaction   Completed at 1232.  Read at 1240.   Normal sinus rhythm. Normal ECG.   Rate 72 bpm. MA interval 156. QRS duration 88. QT/QTc 412/451. P-R-T axes 64 52 50.     Laboratory:  Laboratory findings were communicated with the patient and family who voiced understanding of the findings.    CBC: WBC 9.2, HGB 13.8,    BMP: Glucose 114 (H), BUN 6 (L), Calcium 8.4 (L), o/w WNL. (Creatinine 0.71)   Troponin (Collected 1225): <0.015     Interventions:  1135 Benadryl 25mg IV   1223 Benadryl 25mg IV   1310 Ativan 0.5mg IV   1457 Oxycodone 10mg PO      Emergency Department Course:  Nursing notes and vitals reviewed.  1141 I performed an exam of the patient as documented above.   IV was inserted and blood was drawn for laboratory testing, results above.   EKG obtained in the ED, see results above.    1256 I rechecked the patient and updated the patient and her family on the results of her lab tests.  1449 I rechecked the  patient.   1646 I rechecked the patient.   Findings and plan explained to the Patient and family. Patient discharged home with instructions regarding supportive care, medications, and reasons to return. The importance of close follow-up was reviewed. Patient was prescribed doxycycline and atarax. I personally reviewed the laboratory results with the Patient and family and answered all related questions prior to discharge.   Impression & Plan      Medical Decision Making:  Concepcion Schumacher is a 67 year old female presenting with itching.    Differential diagnosis includes medication reaction, restless leg syndrome, electrolyte abnormality. Patient initially reported full body itching/burning and labs subsequently ordered to evaluate for atypical ACS. Labs returned unremarkable and EKG showed normal sinus rhythm without acute ischemic findings. Patient continued to have discomfort in her legs described as itching and was subseqeuntly given the above medications over a long period of time in the emergency department. Oxycodone was the only medication that helped somewhat minimally. This could potentially be a medication reaction but she has no signs of an allergic reaction or anaphylaxis on exam. At this point I feel she is safe for discharge with recommendations to follow up with her primary care doctor on Monday if her symptoms should persist. She is given a new prescription for doxycycline given her chest x-ray did show a right upper lobe opacity. Recommendations given regarding follow up with primary care doctor and return to the emergency department as needed for new or worsening symptoms. Patient is understanding and agreeable to plan. Patient discharged in stable condition.      Diagnosis:    ICD-10-CM    1. Sensation disturbance of skin R20.9      Disposition:  Discharged to home with the below prescription.     Discharge medications:  New Prescriptions    DOXYCYCLINE (VIBRAMYCIN) 100 MG CAPSULE    Take 1 capsule  (100 mg) by mouth 2 times daily    HYDROXYZINE (ATARAX) 25 MG TABLET    Take 1-2 tablets (25-50 mg) by mouth every 6 hours as needed for itching        Scribe Disclosure:  I, Bayron Mason, am serving as a scribe at 11:35 AM on 12/16/2017 to document services personally performed by Shmuel Levi MD based on my observations and the provider's statements to me.    12/16/2017   [facility] EMERGENCY DEPARTMENT       Shmuel Levi MD  12/16/17 1348

## 2017-12-16 NOTE — ED AVS SNAPSHOT
[facility]    [address]    [facility] [address]    Phone:  [phone]    Fax:  [phone]                                       Concepcion Schumacher   MRN: [id number]    Department:  [facility] Emergency [facility]   Date of Visit:  12/16/2017           After Visit Summary Signature Page     I have received my discharge instructions, and my questions have been answered. I have discussed any challenges I see with this plan with the nurse or doctor.    ..........................................................................................................................................  Patient/Patient Representative Signature      ..........................................................................................................................................  Patient Representative Print Name and Relationship to Patient    ..................................................               ................................................  Date                                            Time    ..........................................................................................................................................  Reviewed by Signature/Title    ...................................................              ..............................................  Date                                                            Time

## 2017-12-16 NOTE — ED AVS SNAPSHOT
[facility] Emergency Department    [address] [facility] [address]    Phone:  [phone]    Fax:  [phone]                                       Concepcion Schumacher   MRN: [id number]    Department:  [facility] Emergency Department   Date of Visit:  12/16/2017           Patient Information     Date Of Birth          1950        Your diagnoses for this visit were:     Sensation disturbance of skin        You were seen by Shmuel Levi MD.      Follow-up Information     Follow up with [facility] Emergency Department.    Specialty:  EMERGENCY MEDICINE    Why:  As needed    Contact information:    [address] [facility] [address] [phone]        Follow up with Diana Phillips APRN CNP In 2 days.    Specialty:  Nurse Practitioner - Family    Why:  As needed    Contact information:    [address] [facility] [address]  [phone]          Discharge Instructions       Take the below medications as prescribed.      New Prescriptions    DOXYCYCLINE (VIBRAMYCIN) 100 MG CAPSULE    Take 1 capsule (100 mg) by mouth 2 times daily    HYDROXYZINE (ATARAX) 25 MG TABLET    Take 1-2 tablets (25-50 mg) by mouth every 6 hours as needed for itching     1. Please take new antibiotic.  2. Try hydroxyzine as needed for continued itching.  3. Please follow-up with your primary doctor on Monday if symptoms persist.  4. Please return to the ED as needed for new or worsening symptoms such as chest pain, shortness of breath, vomiting and unable to keep anything down, any other concerning symptoms.        24 Hour Appointment Hotline       To make an appointment at any [facility] clinic, call [phone] ([phone]). If you don't have a family doctor or clinic, we will help you find one. [facility] clinics are conveniently located to serve the needs of you and your family.             Review of your medicines      START taking         Dose / Directions Last dose taken    doxycycline 100 MG capsule   Commonly known as:  VIBRAMYCIN   Dose:  100 mg   Quantity:  14 capsule        Take 1 capsule (100 mg) by mouth 2 times daily   Refills:  0        hydrOXYzine 25 MG tablet   Commonly known as:  ATARAX   Dose:  25-50 mg   Quantity:  60 tablet        Take 1-2 tablets (25-50 mg) by mouth every 6 hours as needed for itching   Refills:  1          Our records show that you are taking the medicines listed below. If these are incorrect, please call your family doctor or clinic.        Dose / Directions Last dose taken    cefdinir 300 MG capsule   Commonly known as:  OMNICEF   Dose:  300 mg   Quantity:  20 capsule        Take 1 capsule (300 mg) by mouth 2 times daily   Refills:  0        cyclobenzaprine 10 MG tablet   Commonly known as:  FLEXERIL   Dose:  5-10 mg   Quantity:  30 tablet        Take 0.5-1 tablets (5-10 mg) by mouth 3 times daily as needed for muscle spasms   Refills:  1        predniSONE 10 MG tablet   Commonly known as:  DELTASONE   Quantity:  18 tablet        Take 3 tabs daily for 3 days Take 2 tabs daily for 3 days  Take 1 tab daily for 3 days  Then stop   Refills:  0                Prescriptions were sent or printed at these locations (2 Prescriptions)                   Other Prescriptions                Printed at Department/Unit printer (2 of 2)         doxycycline (VIBRAMYCIN) 100 MG capsule               hydrOXYzine (ATARAX) 25 MG tablet                Procedures and tests performed during your visit     Basic metabolic panel    CBC with platelets differential    EKG 12 lead    Troponin I      Orders Needing Specimen Collection     None      Pending Results     Date and Time Order Name Status Description    12/16/2017 1220 EKG 12 lead Preliminary     12/15/2017 1454 M TUBERCULOSIS BY QUANTIFERON In process             Pending Culture Results     No orders found from 12/14/2017 to 12/17/2017.            Pending Results Instructions     If  you had any lab results that were not finalized at the time of your Discharge, you can call the ED Lab Result RN at [phone]. You will be contacted by this team for any positive Lab results or changes in treatment. The nurses are available 7 days a week from 10A to 6:30P.  You can leave a message 24 hours per day and they will return your call.        Test Results From Your Hospital Stay        12/16/2017 12:37 PM      Component Results     Component Value Ref Range & Units Status    WBC 9.2 4.0 - 11.0 10e9/L Final    RBC Count 4.90 3.8 - 5.2 10e12/L Final    Hemoglobin 13.8 11.7 - 15.7 g/dL Final    Hematocrit 41.4 35.0 - 47.0 % Final    MCV 85 78 - 100 fl Final    MCH 28.2 26.5 - 33.0 pg Final    MCHC 33.3 31.5 - 36.5 g/dL Final    RDW 12.8 10.0 - 15.0 % Final    Platelet Count 267 150 - 450 10e9/L Final    Diff Method Automated Method  Final    % Neutrophils 76.6 % Final    % Lymphocytes 16.7 % Final    % Monocytes 5.6 % Final    % Eosinophils 0.1 % Final    % Basophils 0.3 % Final    % Immature Granulocytes 0.7 % Final    Nucleated RBCs 0 0 /100 Final    Absolute Neutrophil 7.1 1.6 - 8.3 10e9/L Final    Absolute Lymphocytes 1.5 0.8 - 5.3 10e9/L Final    Absolute Monocytes 0.5 0.0 - 1.3 10e9/L Final    Absolute Eosinophils 0.0 0.0 - 0.7 10e9/L Final    Absolute Basophils 0.0 0.0 - 0.2 10e9/L Final    Abs Immature Granulocytes 0.1 0 - 0.4 10e9/L Final    Absolute Nucleated RBC 0.0  Final         12/16/2017 12:54 PM      Component Results     Component Value Ref Range & Units Status    Sodium 134 133 - 144 mmol/L Final    Potassium 3.6 3.4 - 5.3 mmol/L Final    Chloride 100 94 - 109 mmol/L Final    Carbon Dioxide 25 20 - 32 mmol/L Final    Anion Gap 9 3 - 14 mmol/L Final    Glucose 114 (H) 70 - 99 mg/dL Final    Urea Nitrogen 6 (L) 7 - 30 mg/dL Final    Creatinine 0.71 0.52 - 1.04 mg/dL Final    GFR Estimate 82 >60 mL/min/1.7m2 Final    Non  GFR Calc    GFR Estimate If Black >90 >60 mL/min/1.7m2  Final     GFR Calc    Calcium 8.4 (L) 8.5 - 10.1 mg/dL Final         12/16/2017 12:54 PM      Component Results     Component Value Ref Range & Units Status    Troponin I ES <0.015 0.000 - 0.045 ug/L Final    The 99th percentile for upper reference range is 0.045 ug/L.  Troponin values   in the range of 0.045 - 0.120 ug/L may be associated with risks of adverse   clinical events.                  Clinical Quality Measure: Blood Pressure Screening     Your blood pressure was checked while you were in the emergency department today. The last reading we obtained was  BP: (!) 179/167 . Please read the guidelines below about what these numbers mean and what you should do about them.  If your systolic blood pressure (the top number) is less than 120 and your diastolic blood pressure (the bottom number) is less than 80, then your blood pressure is normal. There is nothing more that you need to do about it.  If your systolic blood pressure (the top number) is 120-139 or your diastolic blood pressure (the bottom number) is 80-89, your blood pressure may be higher than it should be. You should have your blood pressure rechecked within a year by a primary care provider.  If your systolic blood pressure (the top number) is 140 or greater or your diastolic blood pressure (the bottom number) is 90 or greater, you may have high blood pressure. High blood pressure is treatable, but if left untreated over time it can put you at risk for heart attack, stroke, or kidney failure. You should have your blood pressure rechecked by a primary care provider within the next 4 weeks.  If your provider in the emergency department today gave you specific instructions to follow-up with your doctor or provider even sooner than that, you should follow that instruction and not wait for up to 4 weeks for your follow-up visit.        Thank you for choosing [facility]       Thank you for choosing [facility] for your care. Our goal is always  "to provide you with excellent care. Hearing back from our patients is one way we can continue to improve our services. Please take a few minutes to complete the written survey that you may receive in the mail after you visit with us. Thank you!        [email or web address] Information     [email or web address] lets you send messages to your doctor, view your test results, renew your prescriptions, schedule appointments and more. To sign up, go to [email or web address] . Click on \"Log in\" on the left side of the screen, which will take you to the Welcome page. Then click on \"Sign up Now\" on the right side of the page.     You will be asked to enter the access code listed below, as well as some personal information. Please follow the directions to create your username and password.     Your access code is: [email or web address]  Expires: 3/15/2018  9:40 AM     Your access code will  in 90 days. If you need help or a new code, please call your [facility] clinic or [phone].        Care EveryWhere ID     This is your Care EveryWhere ID. This could be used by other organizations to access your [facility] medical records  [id number]        Equal Access to Services     MINNIE SANTIAGO : Hadjovi Mobley, wamorada rylie, qarob kaalkiko melgar, willy stewart. So [facility] [phone].    ATENCIÓN: Si habla español, tiene a chan disposición servicios gratuitos de asistencia lingüística. Jaclyn al [phone].    We comply with applicable federal civil rights laws and [place] laws. We do not discriminate on the basis of race, color, national origin, age, disability, sex, sexual orientation, or gender identity.            After Visit Summary       This is your record. Keep this with you and show to your community pharmacist(s) and doctor(s) at your next visit.                  "

## 2017-12-16 NOTE — ED NOTES
ABC's intact.  Alert and oriented x4.    Pt states she started antibiotic for possbile pnuemonia.  Took one dose last evening and began itching.  This morning pt took her second dose.  Pt c/o severe itching after this mornings dose.  No wheezing audible on auscultation.

## 2017-12-16 NOTE — ED NOTES
Pt states that she is still experiencing itching sensation at this time, but it has improved some since when she arrived. MD advised.

## 2017-12-16 NOTE — DISCHARGE INSTRUCTIONS
Take the below medications as prescribed.      New Prescriptions    DOXYCYCLINE (VIBRAMYCIN) 100 MG CAPSULE    Take 1 capsule (100 mg) by mouth 2 times daily    HYDROXYZINE (ATARAX) 25 MG TABLET    Take 1-2 tablets (25-50 mg) by mouth every 6 hours as needed for itching     1. Please take new antibiotic.  2. Try hydroxyzine as needed for continued itching.  3. Please follow-up with your primary doctor on Monday if symptoms persist.  4. Please return to the ED as needed for new or worsening symptoms such as chest pain, shortness of breath, vomiting and unable to keep anything down, any other concerning symptoms.

## 2017-12-16 NOTE — TELEPHONE ENCOUNTER
[facility] Triage Call  Presenting Problem: Friend, Matthew calling. States patient began prescribed antibiotic last night at approximately 8:25 hrs and began to have generalized itching at 10:00 PM. Denies any rash.   Caller declined to have writer speak with patient or to have writer obtain an  for patient. Caller was not found in consent to communicate per EPIC.  Due to possible severe allergic reaction writer did triage patient. Caller is with patient at this time.   Caller was not given patients personal health information from medical chart.     Guideline Used:Anaphylaxis - Adult. Itching Widespread.  Patient Recommendations/Teaching: See ED Immediately.       Reason for Disposition    [1] Widespread hives, itching or facial swelling AND [2] onset < 2 hours of exposure to high-risk allergen (e.g., sting, nuts, 1st dose of antibiotic)    Additional Information    Negative: [1] Life-threatening reaction in the past to similar substance (e.g., food, insect bite/sting, medication, etc.) AND [2] < 2 hours since exposure    Negative: Wheezing, stridor, hoarseness, or difficulty breathing    Negative: [1] Tightness in the chest or throat AND [2] begins within 2 hours of exposure to allergic substance    Negative: Difficulty swallowing, drooling or slurred speech    Negative: Difficult to awaken or acting confused (disoriented, slurred speech)    Negative: Unresponsive, passed out or very weak    Negative: Other symptom of severe allergic reaction (Exception: Hives or facial swelling alone)    Negative: Sounds like a life-threatening emergency to the triager    Negative: [1] Widespread hives AND [2] onset > 2 hours after exposure to high-risk allergen (e.g., sting, nuts, 1st dose of antibiotic)    Negative: [1] Life-threatening reaction (anaphylaxis) in the past to similar substance (e.g., food, insect bite/sting, chemical, etc.) AND [2] < 2 hours since exposure    Negative: Difficulty breathing or wheezing     Negative: [1] Difficulty swallowing or slurred speech AND [2] sudden onset    Negative: Sounds like a life-threatening emergency to the triager    Could be severe allergic reaction    Negative: [1] Widespread itching AND [2] onset > 2 hours after exposure to high-risk allergen (e.g., sting, nuts, 1st dose of antibiotic)    Negative: [1] Face swelling AND [2] onset > 2 hours after exposure to high-risk allergen (e.g., sting, nuts, 1st dose of antibiotic)    Protocols used: ANAPHYLAXIS-ADULT-AH, ITCHING - WIDESPREAD-ADULT-AH

## 2017-12-18 ENCOUNTER — TELEPHONE (OUTPATIENT)
Dept: INTERNAL MEDICINE | Facility: CLINIC | Age: 67
End: 2017-12-18

## 2017-12-18 DIAGNOSIS — R76.12 POSITIVE QUANTIFERON-TB GOLD TEST: Primary | ICD-10-CM

## 2017-12-18 DIAGNOSIS — R93.89 CHEST X-RAY ABNORMALITY: ICD-10-CM

## 2017-12-18 LAB
INTERPRETATION ECG - MUSE: NORMAL
M TB TUBERC IFN-G BLD QL: POSITIVE
M TB TUBERC IFN-G/MITOGEN IGNF BLD: 2.16 IU/ML

## 2017-12-18 NOTE — TELEPHONE ENCOUNTER
Isabella, from the [facility] infection prevention called to update that patient's M Tuberculosis came back positive along with a positive chest xray and symptoms.     PCP please review labs from 12/15/17 and advise.   Thank you.

## 2017-12-19 ENCOUNTER — TELEPHONE (OUTPATIENT)
Dept: PEDIATRICS | Facility: CLINIC | Age: 67
End: 2017-12-19

## 2017-12-19 DIAGNOSIS — R76.12 POSITIVE QUANTIFERON-TB GOLD TEST: Primary | ICD-10-CM

## 2017-12-19 DIAGNOSIS — R93.89 ABNORMAL CHEST XRAY: ICD-10-CM

## 2017-12-19 NOTE — TELEPHONE ENCOUNTER
FHN: [facility] [facility] - [place] [phone]   [email or web address]  Needs to make appointment with infectious disease to decide on next steps   Should be seen ASAP

## 2017-12-19 NOTE — TELEPHONE ENCOUNTER
Spoke to pt's son-in-law, relayed message. Advised him that they should try to get an appointment for today, and pt should be wearing masks anywhere she goes in the meantime. Will fax results to [facility].   Pt's son-in-law was advised to call if he has any questions.

## 2017-12-19 NOTE — TELEPHONE ENCOUNTER
Called Erin [facility] nurse, left detailed vm relaying MD message below.     Called lab, hospitals they have the specimen containers there. However do need 3 separate orders for each morning sample. LIO Corrales who spoke with Erin [facility] RN has containers she can bring to pt for collection.    Entered additional 2 sputum specimen orders for lab.

## 2017-12-19 NOTE — TELEPHONE ENCOUNTER
So TB tests were all positive and they need morning sputum tests x 3 separate mornings.  If she can produce samples, it can be done at her home, but if not [facility] can induce specimen.  She says to do it there it needs to be ordered as AFB stain & culture x 3.    Please have someone call her to let her know the plan.  She does not know the pt and what she can do.

## 2017-12-20 DIAGNOSIS — R93.89 ABNORMAL CHEST XRAY: ICD-10-CM

## 2017-12-20 DIAGNOSIS — R76.12 POSITIVE QUANTIFERON-TB GOLD TEST: ICD-10-CM

## 2017-12-20 PROCEDURE — 87206 SMEAR FLUORESCENT/ACID STAI: CPT | Mod: 90 | Performed by: NURSE PRACTITIONER

## 2017-12-20 PROCEDURE — 99000 SPECIMEN HANDLING OFFICE-LAB: CPT | Performed by: NURSE PRACTITIONER

## 2017-12-20 PROCEDURE — 87116 MYCOBACTERIA CULTURE: CPT | Mod: 90 | Performed by: NURSE PRACTITIONER

## 2017-12-21 DIAGNOSIS — R93.89 ABNORMAL CHEST XRAY: ICD-10-CM

## 2017-12-21 DIAGNOSIS — R76.12 POSITIVE QUANTIFERON-TB GOLD TEST: ICD-10-CM

## 2017-12-21 PROCEDURE — 87206 SMEAR FLUORESCENT/ACID STAI: CPT | Mod: 90 | Performed by: NURSE PRACTITIONER

## 2017-12-21 PROCEDURE — 87116 MYCOBACTERIA CULTURE: CPT | Mod: 90 | Performed by: NURSE PRACTITIONER

## 2017-12-21 PROCEDURE — 99000 SPECIMEN HANDLING OFFICE-LAB: CPT | Performed by: NURSE PRACTITIONER

## 2017-12-22 DIAGNOSIS — R93.89 ABNORMAL CHEST XRAY: ICD-10-CM

## 2017-12-22 DIAGNOSIS — R76.12 POSITIVE QUANTIFERON-TB GOLD TEST: ICD-10-CM

## 2017-12-22 DIAGNOSIS — A15.9 TUBERCULOSIS: Primary | ICD-10-CM

## 2017-12-22 PROCEDURE — 87556 M.TUBERCULO DNA AMP PROBE: CPT | Mod: 90 | Performed by: NURSE PRACTITIONER

## 2017-12-22 PROCEDURE — 87206 SMEAR FLUORESCENT/ACID STAI: CPT | Mod: 90 | Performed by: NURSE PRACTITIONER

## 2017-12-22 PROCEDURE — 87798 DETECT AGENT NOS DNA AMP: CPT | Mod: 90 | Performed by: NURSE PRACTITIONER

## 2017-12-22 PROCEDURE — 99000 SPECIMEN HANDLING OFFICE-LAB: CPT | Performed by: NURSE PRACTITIONER

## 2017-12-22 PROCEDURE — 87116 MYCOBACTERIA CULTURE: CPT | Mod: 90 | Performed by: NURSE PRACTITIONER

## 2017-12-23 LAB
ANNOTATION COMMENT IMP: NOT DETECTED
DEPRECATED M TB RPOB XXX QL NAA+PROBE: NORMAL
M TB DNA SPEC QL NAA+PROBE: NOT DETECTED
SPECIMEN SOURCE: NORMAL

## 2017-12-26 LAB
ACID FAST STN SPEC QL: NORMAL
SPECIMEN SOURCE: NORMAL

## 2017-12-29 ENCOUNTER — TRANSFERRED RECORDS (OUTPATIENT)
Dept: HEALTH INFORMATION MANAGEMENT | Facility: CLINIC | Age: 67
End: 2017-12-29

## 2018-01-02 ENCOUNTER — TELEPHONE (OUTPATIENT)
Dept: INTERNAL MEDICINE | Facility: CLINIC | Age: 68
End: 2018-01-02

## 2018-01-02 NOTE — TELEPHONE ENCOUNTER
Melissa RN at [facility], states pt is being tested for TB. She has 3 sputums that the smears are negative and PCR is negative. Pt went to [facility] TB clinic where they repeated a CXR that was normal. Plan is to await sputum cxs which takes 6-8 weeks and if negative will treat pt for latent TB infection (LTBI).     Melissa is faxing over the OV notes from [facility] TB clinic to update PCP and update record.

## 2018-02-15 LAB
MYCOBACTERIUM SPEC CULT: NORMAL
MYCOBACTERIUM SPEC CULT: NORMAL
SPECIMEN SOURCE: NORMAL
SPECIMEN SOURCE: NORMAL

## 2018-02-16 LAB
MYCOBACTERIUM SPEC CULT: NORMAL
MYCOBACTERIUM SPEC CULT: NORMAL
SPECIMEN SOURCE: NORMAL

## 2018-10-22 ENCOUNTER — HEALTH MAINTENANCE LETTER (OUTPATIENT)
Age: 68
End: 2018-10-22

## 2018-11-01 ENCOUNTER — OFFICE VISIT (OUTPATIENT)
Dept: INTERNAL MEDICINE | Facility: CLINIC | Age: 68
End: 2018-11-01

## 2018-11-01 ENCOUNTER — TELEPHONE (OUTPATIENT)
Dept: INTERNAL MEDICINE | Facility: CLINIC | Age: 68
End: 2018-11-01

## 2018-11-01 VITALS
BODY MASS INDEX: 30.31 KG/M2 | HEART RATE: 69 BPM | TEMPERATURE: 97.9 F | HEIGHT: 68 IN | WEIGHT: 200 LBS | DIASTOLIC BLOOD PRESSURE: 68 MMHG | SYSTOLIC BLOOD PRESSURE: 164 MMHG | RESPIRATION RATE: 24 BRPM | OXYGEN SATURATION: 97 %

## 2018-11-01 DIAGNOSIS — I10 ESSENTIAL HYPERTENSION, BENIGN: Primary | ICD-10-CM

## 2018-11-01 DIAGNOSIS — M54.50 LEFT-SIDED LOW BACK PAIN WITHOUT SCIATICA, UNSPECIFIED CHRONICITY: ICD-10-CM

## 2018-11-01 DIAGNOSIS — Z12.31 ENCOUNTER FOR SCREENING MAMMOGRAM FOR BREAST CANCER: ICD-10-CM

## 2018-11-01 DIAGNOSIS — E55.9 VITAMIN D DEFICIENCY: ICD-10-CM

## 2018-11-01 LAB
BASOPHILS # BLD AUTO: 0 10E9/L (ref 0–0.2)
BASOPHILS NFR BLD AUTO: 0.2 %
DIFFERENTIAL METHOD BLD: NORMAL
EOSINOPHIL # BLD AUTO: 0.1 10E9/L (ref 0–0.7)
EOSINOPHIL NFR BLD AUTO: 1 %
ERYTHROCYTE [DISTWIDTH] IN BLOOD BY AUTOMATED COUNT: 13.3 % (ref 10–15)
HCT VFR BLD AUTO: 42.8 % (ref 35–47)
HGB BLD-MCNC: 13.8 G/DL (ref 11.7–15.7)
LYMPHOCYTES # BLD AUTO: 3.1 10E9/L (ref 0.8–5.3)
LYMPHOCYTES NFR BLD AUTO: 53.2 %
MCH RBC QN AUTO: 28.2 PG (ref 26.5–33)
MCHC RBC AUTO-ENTMCNC: 32.2 G/DL (ref 31.5–36.5)
MCV RBC AUTO: 87 FL (ref 78–100)
MONOCYTES # BLD AUTO: 0.5 10E9/L (ref 0–1.3)
MONOCYTES NFR BLD AUTO: 8.2 %
NEUTROPHILS # BLD AUTO: 2.2 10E9/L (ref 1.6–8.3)
NEUTROPHILS NFR BLD AUTO: 37.4 %
PLATELET # BLD AUTO: 234 10E9/L (ref 150–450)
RBC # BLD AUTO: 4.9 10E12/L (ref 3.8–5.2)
WBC # BLD AUTO: 5.9 10E9/L (ref 4–11)

## 2018-11-01 PROCEDURE — 84443 ASSAY THYROID STIM HORMONE: CPT | Performed by: NURSE PRACTITIONER

## 2018-11-01 PROCEDURE — 80053 COMPREHEN METABOLIC PANEL: CPT | Performed by: NURSE PRACTITIONER

## 2018-11-01 PROCEDURE — 85025 COMPLETE CBC W/AUTO DIFF WBC: CPT | Performed by: NURSE PRACTITIONER

## 2018-11-01 PROCEDURE — 82306 VITAMIN D 25 HYDROXY: CPT | Performed by: NURSE PRACTITIONER

## 2018-11-01 PROCEDURE — 99214 OFFICE O/P EST MOD 30 MIN: CPT | Performed by: NURSE PRACTITIONER

## 2018-11-01 PROCEDURE — 82607 VITAMIN B-12: CPT | Performed by: NURSE PRACTITIONER

## 2018-11-01 PROCEDURE — 36415 COLL VENOUS BLD VENIPUNCTURE: CPT | Performed by: NURSE PRACTITIONER

## 2018-11-01 RX ORDER — AMLODIPINE BESYLATE 5 MG/1
5 TABLET ORAL DAILY
Qty: 30 TABLET | Refills: 1 | Status: SHIPPED | OUTPATIENT
Start: 2018-11-01 | End: 2018-12-31

## 2018-11-01 NOTE — NURSING NOTE
"Chief Complaint   Patient presents with     Dizziness     pt c/o dizziness x 1 month.     initial /68  Pulse 69  Temp 97.9  F (36.6  C) (Oral)  Resp 24  Ht 5' 8\" (1.727 m)  Wt 200 lb (90.7 kg)  SpO2 97%  BMI 30.41 kg/m2 Estimated body mass index is 30.41 kg/(m^2) as calculated from the following:    Height as of this encounter: 5' 8\" (1.727 m).    Weight as of this encounter: 200 lb (90.7 kg)..  bp completed using cuff size large  HELENE GIBSON LPN  "

## 2018-11-01 NOTE — MR AVS SNAPSHOT
"              After Visit Summary   11/1/2018    Concepcion Schumacher    MRN: [id number]           Patient Information     Date Of Birth          1950        Visit Information        Provider Department      11/1/2018 4:20 PM Diana Phillips APRN CNP [facility]        Today's Diagnoses     Essential hypertension, benign    -  1    Left-sided low back pain without sciatica, unspecified chronicity        Vitamin D deficiency          Care Instructions    Lab in [address]    Amlodipine once daily for blood pressure     May use heat or ice to back for comfort     Follow up in a month           Follow-ups after your visit        Who to contact     If you have questions or need follow up information about today's clinic visit or your schedule please contact [facility] directly at [phone].  Normal or non-critical lab and imaging results will be communicated to you by MyChart, letter or phone within 4 business days after the clinic has received the results. If you do not hear from us within 7 days, please contact the clinic through MyChart or phone. If you have a critical or abnormal lab result, we will notify you by phone as soon as possible.  Submit refill requests through [employer] or call your pharmacy and they will forward the refill request to us. Please allow 3 business days for your refill to be completed.          Additional Information About Your Visit        Care EveryWhere ID     This is your Care EveryWhere ID. This could be used by other organizations to access your [place] medical records  [id number]        Your Vitals Were     Pulse Temperature Respirations Height Pulse Oximetry BMI (Body Mass Index)    69 97.9  F (36.6  C) (Oral) 24 5' 8\" (1.727 m) 97% 30.41 kg/m2       Blood Pressure from Last 3 Encounters:   11/01/18 164/68   12/16/17 (!) 164/97   12/15/17 110/58    Weight from Last 3 Encounters:   11/01/18 200 lb (90.7 kg)   12/15/17 196 lb 11.2 oz (89.2 kg) "   08/18/17 194 lb 9.6 oz (88.3 kg)              We Performed the Following     CBC with platelets differential     Comprehensive metabolic panel     TSH with free T4 reflex     Vitamin B12     Vitamin D Deficiency          Today's Medication Changes          These changes are accurate as of 11/1/18  4:52 PM.  If you have any questions, ask your nurse or doctor.               Start taking these medicines.        Dose/Directions    amLODIPine 5 MG tablet   Commonly known as:  NORVASC   Used for:  Essential hypertension, benign   Started by:  Diana Phillips APRN CNP        Dose:  5 mg   Take 1 tablet (5 mg) by mouth daily   Quantity:  30 tablet   Refills:  1            Where to get your medicines      These medications were sent to [id number] - [place], [place] - [address]  [address], [facility] [address]     Phone:  [phone]     amLODIPine 5 MG tablet                Primary Care Provider Office Phone # Fax #    HOLLY Sylvester [phone]       [address]  [facility] [address]        Equal Access to Services     [facility]: Hadii sri ku hadasho Soomaali, waaxda luqadaha, qaybta kaalmada adeegyada, waxay seain haylynne ornelas . So [facility] [phone].    ATENCIÓN: Si habla español, tiene a chan disposición servicios gratuitos de asistencia lingüística. Llame al [phone].    We comply with applicable federal civil rights laws and [place] laws. We do not discriminate on the basis of race, color, national origin, age, disability, sex, sexual orientation, or gender identity.            Thank you!     Thank you for choosing [facility]  for your care. Our goal is always to provide you with excellent care. Hearing back from our patients is one way we can continue to improve our services. Please take a few minutes to complete the written survey that you may receive in the mail after your visit with us. Thank you!              Your Updated Medication List - Protect others around you: Learn how to safely use, store and throw away your medicines at www.disposemymeds.org.          This list is accurate as of 11/1/18  4:52 PM.  Always use your most recent med list.                   Brand Name Dispense Instructions for use Diagnosis    amLODIPine 5 MG tablet    NORVASC    30 tablet    Take 1 tablet (5 mg) by mouth daily    Essential hypertension, benign       CALCIUM 500 PO

## 2018-11-01 NOTE — PROGRESS NOTES
.  SUBJECTIVE:   Concepcion Schumacher is a 68 year old female who presents to clinic today for the following health issues:  Chief Complaint   Patient presents with     Dizziness     pt c/o dizziness x 1 month.   Blood pressure high     Low back pain off and on and happens more often when she bends   She says is on bone but in muscle beside lumbar spine - does not want to do anything for this   Takes no medication for it      Dizziness started after TB medication - took for 6 months - dizziness intermittent- feels like ringing when she is dizzy   2 month off medication and still having spells      Her blood pressure over time on review of her chart has been high since 2012 off and on   She has a strong family history of blood pressure issues   She has not been on medication as she has refused in past  I suspect dizziness is related to blood pressure being high                Problem list and histories reviewed & adjusted, as indicated.  Additional history: as documented    Patient Active Problem List   Diagnosis     Hypercalcemia     Primary hyperparathyroidism (H)     Hyperparathyroidism (H)     CARDIOVASCULAR SCREENING; LDL GOAL LESS THAN 160     Mantoux: positive     Nausea and vomiting     Past Surgical History:   Procedure Laterality Date     PARATHYROIDECTOMY  7/3/2012    Procedure: PARATHYROIDECTOMY;  Neck exploration with Excision parathyroid adenoma, with rapid PTH assay, preoperative sestimeby injection;  Surgeon: Jaclyn Sanchez MD;  Location:  OR       Social History   Substance Use Topics     Smoking status: Never Smoker     Smokeless tobacco: Never Used     Alcohol use No     Family History   Problem Relation Age of Onset     Hypertension Sister      Family History Negative Mother      Family History Negative Father            Reviewed and updated as needed this visit by clinical staff  Tobacco  Allergies  Meds  Problems  Med Hx  Surg Hx  Fam Hx  Soc Hx        Reviewed and updated as needed this  "visit by Provider  Allergies  Meds  Problems         ROS:  Constitutional, HEENT, cardiovascular, pulmonary, gi and gu systems are negative, except as otherwise noted.    OBJECTIVE:     /68  Pulse 69  Temp 97.9  F (36.6  C) (Oral)  Resp 24  Ht 5' 8\" (1.727 m)  Wt 200 lb (90.7 kg)  SpO2 97%  BMI 30.41 kg/m2  Body mass index is 30.41 kg/(m^2).  GENERAL: alert and no distress- laying in chair - not sitting up much   Sister in room with her - posture poor - may be cause of her back pain    Her sister  for her and she speaks some English, but some of history taking limited related to understanding/ language barrier   RESP: lungs clear to auscultation - no rales, rhonchi or wheezes  CV: regular rate and rhythm  MS: no gross musculoskeletal defects noted, no edema  Pain in lumbar spine with compression of lumbar spine muscles - no pain without pushing on area   Able to bend and straighten    NEURO: Normal strength and tone, mentation intact and speech normal  Her gait normal   PSYCH: mentation appears normal, affect normal/bright    Diagnostic Test Results:  Lab     ASSESSMENT/PLAN:             1. Essential hypertension, benign  Needs treatment   Has not been compliant with visits or medication in past, per sister   - CBC with platelets differential  - Comprehensive metabolic panel  - TSH with free T4 reflex  - Vitamin B12  - amLODIPine (NORVASC) 5 MG tablet; Take 1 tablet (5 mg) by mouth daily  Dispense: 30 tablet; Refill: 1    2. Left-sided low back pain without sciatica, unspecified chronicity  Ice or heat   Would consider x ray or muscle relaxer If persists - declines today      3. Vitamin D deficiency    - Vitamin D Deficiency    Patient Instructions   Lab in [address]    Amlodipine once daily for blood pressure     May use heat or ice to back for comfort     Follow up in a month       HOLLY Sylvester [facility]    "

## 2018-11-01 NOTE — PATIENT INSTRUCTIONS
Lab in [address]    Amlodipine once daily for blood pressure     May use heat or ice to back for comfort     Follow up in a month

## 2018-11-01 NOTE — TELEPHONE ENCOUNTER
Phone, spoke to patient.  Panel Management Review      Patient has the following on her problem list: None      Composite cancer screening  Chart review shows that this patient is due/due soon for the following Pap Smear and Mammogram  Summary:    Patient is due/failing the following:   MAMMOGRAM and PAP    Action needed:   Patient needs office visit for see above.    Type of outreach:pt has appointment today will advise.Phone, spoke to patient.  pt has appointment today will advise.    Questions for provider review:    None                                                                                                                                    .HELENE GIBSON LPN       Chart routed to none .

## 2018-11-02 LAB
ALBUMIN SERPL-MCNC: 3.5 G/DL (ref 3.4–5)
ALP SERPL-CCNC: 88 U/L (ref 40–150)
ALT SERPL W P-5'-P-CCNC: 20 U/L (ref 0–50)
ANION GAP SERPL CALCULATED.3IONS-SCNC: 7 MMOL/L (ref 3–14)
AST SERPL W P-5'-P-CCNC: 21 U/L (ref 0–45)
BILIRUB SERPL-MCNC: 0.5 MG/DL (ref 0.2–1.3)
BUN SERPL-MCNC: 10 MG/DL (ref 7–30)
CALCIUM SERPL-MCNC: 9.7 MG/DL (ref 8.5–10.1)
CHLORIDE SERPL-SCNC: 101 MMOL/L (ref 94–109)
CO2 SERPL-SCNC: 30 MMOL/L (ref 20–32)
CREAT SERPL-MCNC: 0.87 MG/DL (ref 0.52–1.04)
DEPRECATED CALCIDIOL+CALCIFEROL SERPL-MC: 23 UG/L (ref 20–75)
GFR SERPL CREATININE-BSD FRML MDRD: 65 ML/MIN/1.7M2
GLUCOSE SERPL-MCNC: 97 MG/DL (ref 70–99)
POTASSIUM SERPL-SCNC: 3.8 MMOL/L (ref 3.4–5.3)
PROT SERPL-MCNC: 8.9 G/DL (ref 6.8–8.8)
SODIUM SERPL-SCNC: 138 MMOL/L (ref 133–144)
TSH SERPL DL<=0.005 MIU/L-ACNC: 0.88 MU/L (ref 0.4–4)
VIT B12 SERPL-MCNC: 628 PG/ML (ref 193–986)

## 2018-11-08 ENCOUNTER — HOSPITAL ENCOUNTER (OUTPATIENT)
Dept: MAMMOGRAPHY | Facility: CLINIC | Age: 68
Discharge: HOME | End: 2018-11-08
Attending: NURSE PRACTITIONER | Admitting: NURSE PRACTITIONER

## 2018-11-08 DIAGNOSIS — Z12.31 ENCOUNTER FOR SCREENING MAMMOGRAM FOR BREAST CANCER: ICD-10-CM

## 2018-11-08 PROCEDURE — 77067 SCR MAMMO BI INCL CAD: CPT

## 2018-12-07 PROBLEM — I10 ESSENTIAL HYPERTENSION: Status: ACTIVE | Noted: 2018-12-07

## 2018-12-31 DIAGNOSIS — I10 ESSENTIAL HYPERTENSION, BENIGN: ICD-10-CM

## 2018-12-31 NOTE — LETTER
[facility]  [address], [address]  [place]  [address]          January 10, 2019      Concepcion Schumacher  [address] [place] [address]              Dear Concepcion Schumacher    We have tried to contact you on several occasions .   This is to remind you that you are due for a follow up B/P check before your next refill   A wellness visit is also due .   You may call our office at [phone] to schedule an appointment.    Please disregard this notice if you have had this procedure repeated or already made an appointment.        Sincerely,      [facility]

## 2019-01-01 RX ORDER — AMLODIPINE BESYLATE 5 MG/1
5 TABLET ORAL DAILY
Qty: 30 TABLET | Refills: 0 | Status: SHIPPED | OUTPATIENT
Start: 2019-01-01 | End: 2019-01-11

## 2019-01-01 NOTE — TELEPHONE ENCOUNTER
"Requested Prescriptions   Pending Prescriptions Disp Refills     amLODIPine (NORVASC) 5 MG tablet [Pharmacy Med Name: AmLODIPine Besylate Oral Tablet 5 MG] 30 tablet 0          Last Written Prescription Date:  11/1/18  Last Fill Quantity: 30,   # refills: 1  Last Office Visit: 11/1/18  Future Office visit:         BP out or range/not at goal - office visit notes advise compliance concern - f/u in one month   Sig: Take 1 tablet (5 mg) by mouth daily    Calcium Channel Blockers Protocol  Failed - 12/31/2018  3:23 PM       Failed - Blood pressure under 140/90 in past 12 months    BP Readings from Last 3 Encounters:   11/01/18 164/68   12/16/17 (!) 164/97   12/15/17 110/58                Passed - Recent (12 mo) or future (30 days) visit within the authorizing provider's specialty    Patient had office visit in the last 12 months or has a visit in the next 30 days with authorizing provider or within the authorizing provider's specialty.  See \"Patient Info\" tab in inbasket, or \"Choose Columns\" in Meds & Orders section of the refill encounter.             Passed - Patient is age 18 or older       Passed - No active pregnancy on record       Passed - Normal serum creatinine on file in past 12 months    Recent Labs   Lab Test 11/01/18  1651   CR 0.87            Passed - No positive pregnancy test in past 12 months        Medication is being filled for 1 time refill only due to:  Patient needs to be seen because due for one month f/u per provider - Alan for HTN.     Signed Prescriptions:                        Disp   Refills    amLODIPine (NORVASC) 5 MG tablet           30 tab*0        Sig: Take 1 tablet (5 mg) by mouth daily   Authorizing Provider: JACQUIE WOLF  Ordering User: JANNIE BLACK      Routing to RI Team - one month refill given - patient due for one month follow up office visit for blood pressure/medication    Thank you,  Jannie Black RN      "

## 2019-01-10 NOTE — TELEPHONE ENCOUNTER
Left message on Vouce mail to call back RE: Need to have appiontment For Follow up B/P and Physical .     This 3rd attempt will also send Letter to Home /done     MARIN Owusu LPN

## 2019-01-11 ENCOUNTER — OFFICE VISIT (OUTPATIENT)
Dept: INTERNAL MEDICINE | Facility: CLINIC | Age: 69
End: 2019-01-11

## 2019-01-11 VITALS
OXYGEN SATURATION: 96 % | DIASTOLIC BLOOD PRESSURE: 62 MMHG | SYSTOLIC BLOOD PRESSURE: 136 MMHG | HEART RATE: 62 BPM | BODY MASS INDEX: 30.92 KG/M2 | RESPIRATION RATE: 16 BRPM | HEIGHT: 68 IN | TEMPERATURE: 98.1 F | WEIGHT: 204 LBS

## 2019-01-11 DIAGNOSIS — I10 ESSENTIAL HYPERTENSION, BENIGN: ICD-10-CM

## 2019-01-11 DIAGNOSIS — Z00.01 ENCOUNTER FOR GENERAL ADULT MEDICAL EXAMINATION WITH ABNORMAL FINDINGS: Primary | ICD-10-CM

## 2019-01-11 DIAGNOSIS — R76.11 MANTOUX: POSITIVE, TREATED: ICD-10-CM

## 2019-01-11 DIAGNOSIS — I10 ESSENTIAL HYPERTENSION: ICD-10-CM

## 2019-01-11 LAB
BASOPHILS # BLD AUTO: 0 10E9/L (ref 0–0.2)
BASOPHILS NFR BLD AUTO: 0.3 %
DIFFERENTIAL METHOD BLD: NORMAL
EOSINOPHIL # BLD AUTO: 0.4 10E9/L (ref 0–0.7)
EOSINOPHIL NFR BLD AUTO: 6.1 %
ERYTHROCYTE [DISTWIDTH] IN BLOOD BY AUTOMATED COUNT: 13.4 % (ref 10–15)
HCT VFR BLD AUTO: 42 % (ref 35–47)
HGB BLD-MCNC: 13.6 G/DL (ref 11.7–15.7)
LYMPHOCYTES # BLD AUTO: 2.7 10E9/L (ref 0.8–5.3)
LYMPHOCYTES NFR BLD AUTO: 44 %
MCH RBC QN AUTO: 27.8 PG (ref 26.5–33)
MCHC RBC AUTO-ENTMCNC: 32.4 G/DL (ref 31.5–36.5)
MCV RBC AUTO: 86 FL (ref 78–100)
MONOCYTES # BLD AUTO: 0.5 10E9/L (ref 0–1.3)
MONOCYTES NFR BLD AUTO: 7.9 %
NEUTROPHILS # BLD AUTO: 2.5 10E9/L (ref 1.6–8.3)
NEUTROPHILS NFR BLD AUTO: 41.7 %
PLATELET # BLD AUTO: 231 10E9/L (ref 150–450)
RBC # BLD AUTO: 4.9 10E12/L (ref 3.8–5.2)
WBC # BLD AUTO: 6.1 10E9/L (ref 4–11)

## 2019-01-11 PROCEDURE — 99397 PER PM REEVAL EST PAT 65+ YR: CPT | Performed by: NURSE PRACTITIONER

## 2019-01-11 PROCEDURE — 36415 COLL VENOUS BLD VENIPUNCTURE: CPT | Performed by: NURSE PRACTITIONER

## 2019-01-11 PROCEDURE — 85025 COMPLETE CBC W/AUTO DIFF WBC: CPT | Performed by: NURSE PRACTITIONER

## 2019-01-11 PROCEDURE — 80061 LIPID PANEL: CPT | Performed by: NURSE PRACTITIONER

## 2019-01-11 PROCEDURE — 80053 COMPREHEN METABOLIC PANEL: CPT | Performed by: NURSE PRACTITIONER

## 2019-01-11 PROCEDURE — 84443 ASSAY THYROID STIM HORMONE: CPT | Performed by: NURSE PRACTITIONER

## 2019-01-11 RX ORDER — AMLODIPINE BESYLATE 5 MG/1
5 TABLET ORAL DAILY
Qty: 90 TABLET | Refills: 3 | Status: SHIPPED | OUTPATIENT
Start: 2019-01-11 | End: 2020-02-21

## 2019-01-11 ASSESSMENT — ENCOUNTER SYMPTOMS
COUGH: 0
CONSTIPATION: 0
ARTHRALGIAS: 0
FREQUENCY: 1
COUGH: 1
DIZZINESS: 0
CHILLS: 0
HEADACHES: 0
NAUSEA: 1
SHORTNESS OF BREATH: 0
DYSURIA: 0
HEMATOCHEZIA: 0
HEMATURIA: 0
DIARRHEA: 0
SORE THROAT: 1
WEAKNESS: 0
JOINT SWELLING: 0
ABDOMINAL PAIN: 0
BREAST MASS: 0
HEARTBURN: 0
NAUSEA: 0
PARESTHESIAS: 0
PALPITATIONS: 0
MYALGIAS: 0
EYE PAIN: 0

## 2019-01-11 ASSESSMENT — MIFFLIN-ST. JEOR: SCORE: 1503.84

## 2019-01-11 ASSESSMENT — ACTIVITIES OF DAILY LIVING (ADL): CURRENT_FUNCTION: NO ASSISTANCE NEEDED

## 2019-01-11 NOTE — PROGRESS NOTES
"SUBJECTIVE:   Concepcion Schumacher is a 69 year old female who presents for Preventive Visit.      Are you in the first 12 months of your Medicare coverage?  No    Annual Wellness Visit     In general, how would you rate your overall health?  Good    Frequency of exercise:  2-3 days/week    Duration of exercise:  30-45 minutes    Do you usually eat at least 4 servings of fruit and vegetables a day, include whole grains    & fiber and avoid regularly eating high fat or \"junk\" foods?  Yes    Taking medications regularly:  Yes    Medication side effects:  None    Ability to successfully perform activities of daily living:  No assistance needed    Home Safety:  No safety concerns identified    Hearing Impairment:  Difficulty following dialogue in the theater and no hearing concerns    In the past 6 months, have you been bothered by leaking of urine?  No    In general, how would you rate your overall mental or emotional health?  Good    PHQ-2 Total Score: 0    Additional concerns today:  No    Do you feel safe in your environment? Yes    Do you have a Health Care Directive? No: Advance care planning was reviewed with patient; patient declined at this time.      Fall risk  Fallen 2 or more times in the past year?: No  Any fall with injury in the past year?: No    Cognitive Screening   1) Repeat 3 items (Leader, Season, Table)    2) Clock draw: NORMAL  3) 3 item recall: Recalls 1 object   Results: NORMAL clock, 1-2 items recalled: COGNITIVE IMPAIRMENT LESS LIKELY    Mini-CogTM Copyright SARA Paul. Licensed by the author for use in [facility]; reprinted with permission (israel@.[facility]). All rights reserved.      Do you have sleep apnea, excessive snoring or daytime drowsiness?: no    Reviewed and updated as needed this visit by clinical staff  Tobacco  Allergies  Meds  Med Hx  Surg Hx  Fam Hx  Soc Hx        Reviewed and updated as needed this visit by Provider  Allergies        Social History     Tobacco Use     " Smoking status: Never Smoker     Smokeless tobacco: Never Used   Substance Use Topics     Alcohol use: No       Alcohol Use 1/11/2019   If you drink alcohol do you typically have greater than 3 drinks per day OR greater than 7 drinks per week? No               Current providers sharing in care for this patient include:   Patient Care Team:  Diana Phillips APRN CNP as PCP - General (Nurse Practitioner - Family)  Diana Phillips APRN CNP as PCP - Assigned PCP    The following health maintenance items are reviewed in Epic and correct as of today:  Health Maintenance   Topic Date Due     HEPATITIS C SCREENING  01/01/1969     DTAP/TDAP/TD IMMUNIZATION (1 - Tdap) 01/01/1976     LIPID SCREEN Q5 YR FEMALE (SYSTEM ASSIGNED)  01/01/1996     ZOSTER IMMUNIZATION (1 of 2) 01/01/2001     ADVANCE DIRECTIVE PLANNING Q5 YRS  01/01/2006     DEXA SCAN SCREENING (SYSTEM ASSIGNED)  01/01/2016     PNEUMOVAX IMMUNIZATION 65+ LOW/MEDIUM RISK (1 of 2 - PCV13) 01/01/2016     FALL RISK ASSESSMENT  04/26/2017     PHQ-2 Q1 YR  08/18/2018     MAMMO Q1 YR  11/08/2019     COLON CANCER SCREEN (SYSTEM ASSIGNED)  09/20/2023     INFLUENZA VACCINE  Addressed     IPV IMMUNIZATION  Aged Out     MENINGITIS IMMUNIZATION  Aged Out       Pneumonia Vaccine:refused   Refused tetanus shot       Review of Systems   Constitutional: Negative for chills.   HENT: Negative for congestion, ear pain and hearing loss.    Eyes: Negative for pain and visual disturbance.   Respiratory: Negative for cough and shortness of breath.    Cardiovascular: Negative for chest pain, palpitations and peripheral edema.   Gastrointestinal: Negative for abdominal pain, constipation, diarrhea, heartburn, hematochezia and nausea.   Breasts:  Negative for tenderness, breast mass and discharge.   Genitourinary: Negative for dysuria, genital sores, hematuria, pelvic pain, urgency, vaginal bleeding and vaginal discharge.   Musculoskeletal: Negative for arthralgias, joint swelling  "and myalgias.   Skin: Negative for rash.   Neurological: Negative for dizziness, weakness, headaches and paresthesias.   Psychiatric/Behavioral: Negative for mood changes.     Was on medication that made her go to bathroom a lot - TB stuff - completed 9/2018   Not voiding too often now     OBJECTIVE:   /62   Pulse 62   Temp 98.1  F (36.7  C) (Oral)   Resp 16   Ht 1.727 m (5' 8\")   Wt 92.5 kg (204 lb)   SpO2 96%   BMI 31.02 kg/m   Estimated body mass index is 31.02 kg/m  as calculated from the following:    Height as of this encounter: 1.727 m (5' 8\").    Weight as of this encounter: 92.5 kg (204 lb).  Physical Exam  GENERAL APPEARANCE: alert and no distress- son interprets for her   EYES: Eyes grossly normal to inspection,  and conjunctivae and sclerae normal  HENT: ear canals and TM's normal, nose and mouth without ulcers or lesions, oropharynx clear and oral mucous membranes moist  NECK: no adenopathy, no asymmetry, masses, or scars and thyroid normal to palpation  RESP: lungs clear to auscultation - no rales, rhonchi or wheezes  BREAST: declined exam   CV: regular rate and rhythm, normal S1 S2, no S3 or S4, no murmur, click or rub, no peripheral edema   ABDOMEN: soft, nontender, no hepatosplenomegaly, no masses and bowel sounds normal- states occasionally has some epigastric tenderness - not today   MS: no musculoskeletal defects are noted and gait is age appropriate without ataxia  SKIN: no suspicious lesions or rashes  NEURO: Normal strength and tone, sensory exam grossly normal, mentation intact and speech normal  PSYCH: mentation appears normal and affect normal/bright    Diagnostic Test Results:  Lab     ASSESSMENT / PLAN:   1. Encounter for general adult medical examination with abnormal findings    - Lipid panel reflex to direct LDL Fasting  - Comprehensive metabolic panel  - TSH with free T4 reflex  - CBC with platelets differential    2. Essential hypertension  In good range on current " "medication   - Comprehensive metabolic panel  - TSH with free T4 reflex  - CBC with platelets differential    3. Mantoux: positive, treated  Completed 9/2018    4. Essential hypertension, benign    - amLODIPine (NORVASC) 5 MG tablet; Take 1 tablet (5 mg) by mouth daily  Dispense: 90 tablet; Refill: 3    End of Life Planning:  Patient currently has an advanced directive: No.  I have verified the patient's ablity to prepare an advanced directive/make health care decisions.  Literature was provided to assist patient in preparing an advanced directive.    COUNSELING:  Reviewed preventive health counseling, as reflected in patient instructions       Regular exercise       Healthy diet/nutrition       Immunizations    Declined: Pneumococcal, TDAP and Zoster due to Other chose not to recive and declined to give reason                Osteoporosis Prevention/Bone Health    BP Readings from Last 1 Encounters:   01/11/19 136/62     Estimated body mass index is 31.02 kg/m  as calculated from the following:    Height as of this encounter: 1.727 m (5' 8\").    Weight as of this encounter: 92.5 kg (204 lb).           reports that  has never smoked. she has never used smokeless tobacco.      Appropriate preventive services were discussed with this patient, including applicable screening as appropriate for cardiovascular disease, diabetes, osteopenia/osteoporosis, and glaucoma.  As appropriate for age/gender, discussed screening for colorectal cancer, prostate cancer, breast cancer, and cervical cancer. Checklist reviewing preventive services available has been given to the patient.    Reviewed patients plan of care and provided an AVS. The Basic Care Plan (routine screening as documented in Health Maintenance) for Concepcion meets the Care Plan requirement. This Care Plan has been established and reviewed with the Patient and son.    Counseling Resources:  ATP IV Guidelines  Pooled Cohorts Equation Calculator  Breast Cancer Risk " Calculator  FRAX Risk Assessment  ICSI Preventive Guidelines  Dietary Guidelines for Americans, 2010  USDA's MyPlate  ASA Prophylaxis  Lung CA Screening    HOLLY Sylvester CNP  [facility]

## 2019-01-11 NOTE — NURSING NOTE
"Chief Complaint   Patient presents with     Medicare Visit     fasting     initial /62   Pulse 62   Temp 98.1  F (36.7  C) (Oral)   Resp 16   Ht 1.727 m (5' 8\")   Wt 92.5 kg (204 lb)   SpO2 96%   BMI 31.02 kg/m   Estimated body mass index is 31.02 kg/m  as calculated from the following:    Height as of this encounter: 1.727 m (5' 8\").    Weight as of this encounter: 92.5 kg (204 lb)..  bp completed using cuff size large  HELENE GIBSON LPN  "

## 2019-01-12 LAB
ALBUMIN SERPL-MCNC: 3.4 G/DL (ref 3.4–5)
ALP SERPL-CCNC: 90 U/L (ref 40–150)
ALT SERPL W P-5'-P-CCNC: 18 U/L (ref 0–50)
ANION GAP SERPL CALCULATED.3IONS-SCNC: 5 MMOL/L (ref 3–14)
AST SERPL W P-5'-P-CCNC: 18 U/L (ref 0–45)
BILIRUB SERPL-MCNC: 0.4 MG/DL (ref 0.2–1.3)
BUN SERPL-MCNC: 8 MG/DL (ref 7–30)
CALCIUM SERPL-MCNC: 9.5 MG/DL (ref 8.5–10.1)
CHLORIDE SERPL-SCNC: 104 MMOL/L (ref 94–109)
CHOLEST SERPL-MCNC: 257 MG/DL
CO2 SERPL-SCNC: 29 MMOL/L (ref 20–32)
CREAT SERPL-MCNC: 0.83 MG/DL (ref 0.52–1.04)
GFR SERPL CREATININE-BSD FRML MDRD: 73 ML/MIN/{1.73_M2}
GLUCOSE SERPL-MCNC: 92 MG/DL (ref 70–99)
HDLC SERPL-MCNC: 44 MG/DL
LDLC SERPL CALC-MCNC: 174 MG/DL
NONHDLC SERPL-MCNC: 213 MG/DL
POTASSIUM SERPL-SCNC: 4.3 MMOL/L (ref 3.4–5.3)
PROT SERPL-MCNC: 8.2 G/DL (ref 6.8–8.8)
SODIUM SERPL-SCNC: 138 MMOL/L (ref 133–144)
TRIGL SERPL-MCNC: 196 MG/DL
TSH SERPL DL<=0.005 MIU/L-ACNC: 1.09 MU/L (ref 0.4–4)

## 2019-01-15 DIAGNOSIS — E78.00 HYPERCHOLESTEREMIA: Primary | ICD-10-CM

## 2019-01-15 RX ORDER — ATORVASTATIN CALCIUM 20 MG/1
20 TABLET, FILM COATED ORAL DAILY
Qty: 90 TABLET | Refills: 3 | Status: SHIPPED | OUTPATIENT
Start: 2019-01-15 | End: 2020-03-10

## 2020-02-21 DIAGNOSIS — I10 ESSENTIAL HYPERTENSION, BENIGN: ICD-10-CM

## 2020-02-21 RX ORDER — AMLODIPINE BESYLATE 5 MG/1
5 TABLET ORAL DAILY
Qty: 30 TABLET | Refills: 0 | Status: SHIPPED | OUTPATIENT
Start: 2020-02-21 | End: 2020-03-10

## 2020-02-21 NOTE — TELEPHONE ENCOUNTER
"Requested Prescriptions   Pending Prescriptions Disp Refills     amLODIPine (NORVASC) 5 MG tablet [Pharmacy Med Name: amLODIPine Besylate Oral Tablet 5 MG] 90 tablet 2     Sig: Take 1 tablet (5 mg) by mouth daily       Calcium Channel Blockers Protocol  Failed - 2/21/2020  9:48 AM        Failed - Blood pressure under 140/90 in past 12 months     BP Readings from Last 3 Encounters:   01/11/19 136/62   11/01/18 164/68   12/16/17 (!) 164/97                 Failed - Recent (12 mo) or future (30 days) visit within the authorizing provider's specialty     Patient has had an office visit with the authorizing provider or a provider within the authorizing providers department within the previous 12 mos or has a future within next 30 days. See \"Patient Info\" tab in inbasket, or \"Choose Columns\" in Meds & Orders section of the refill encounter.              Failed - Normal serum creatinine on file in past 12 months     Recent Labs   Lab Test 01/11/19  0929   CR 0.83             Passed - Medication is active on med list        Passed - Patient is age 18 or older        Passed - No active pregnancy on record        Passed - No positive pregnancy test in past 12 months        Last Written Prescription Date:  1/11/19  Last Fill Quantity: 90,  # refills: 3   Last office visit: 1/11/2019 with prescribing provider:  1/11/19   Future Office Visit:      "

## 2020-02-24 NOTE — TELEPHONE ENCOUNTER
"Requested Prescriptions   Pending Prescriptions Disp Refills     amLODIPine (NORVASC) 5 MG tablet [Pharmacy Med Name: amLODIPine  Last Written Prescription Date:  2/21/2020  Last Fill Quantity: 30,  # refills: 0   Last office visit: 1/11/2019 with prescribing provider:     Future Office Visit:   Besylate Oral Tablet 5 MG] 90 tablet 2     Sig: Take 1 tablet (5 mg) by mouth daily       Calcium Channel Blockers Protocol  Failed - 2/21/2020  7:47 PM        Failed - Blood pressure under 140/90 in past 12 months     BP Readings from Last 3 Encounters:   01/11/19 136/62   11/01/18 164/68   12/16/17 (!) 164/97                 Failed - Recent (12 mo) or future (30 days) visit within the authorizing provider's specialty     Patient has had an office visit with the authorizing provider or a provider within the authorizing providers department within the previous 12 mos or has a future within next 30 days. See \"Patient Info\" tab in inbasket, or \"Choose Columns\" in Meds & Orders section of the refill encounter.              Failed - Normal serum creatinine on file in past 12 months     Recent Labs   Lab Test 01/11/19  0929   CR 0.83             Passed - Medication is active on med list        Passed - Patient is age 18 or older        Passed - No active pregnancy on record        Passed - No positive pregnancy test in past 12 months        "

## 2020-02-25 RX ORDER — AMLODIPINE BESYLATE 5 MG/1
5 TABLET ORAL DAILY
Qty: 90 TABLET | Refills: 2 | OUTPATIENT
Start: 2020-02-25

## 2020-03-10 ENCOUNTER — OFFICE VISIT (OUTPATIENT)
Dept: INTERNAL MEDICINE | Facility: CLINIC | Age: 70
End: 2020-03-10

## 2020-03-10 VITALS
BODY MASS INDEX: 31.83 KG/M2 | RESPIRATION RATE: 16 BRPM | HEIGHT: 68 IN | WEIGHT: 210 LBS | TEMPERATURE: 98 F | SYSTOLIC BLOOD PRESSURE: 136 MMHG | OXYGEN SATURATION: 99 % | HEART RATE: 68 BPM | DIASTOLIC BLOOD PRESSURE: 72 MMHG

## 2020-03-10 DIAGNOSIS — I10 ESSENTIAL HYPERTENSION: Primary | ICD-10-CM

## 2020-03-10 DIAGNOSIS — E78.00 HYPERCHOLESTEREMIA: ICD-10-CM

## 2020-03-10 DIAGNOSIS — I10 ESSENTIAL HYPERTENSION, BENIGN: ICD-10-CM

## 2020-03-10 DIAGNOSIS — Z13.6 CARDIOVASCULAR SCREENING; LDL GOAL LESS THAN 160: ICD-10-CM

## 2020-03-10 LAB
BASOPHILS # BLD AUTO: 0 10E9/L (ref 0–0.2)
BASOPHILS NFR BLD AUTO: 0.3 %
DIFFERENTIAL METHOD BLD: ABNORMAL
EOSINOPHIL # BLD AUTO: 0.2 10E9/L (ref 0–0.7)
EOSINOPHIL NFR BLD AUTO: 2.7 %
ERYTHROCYTE [DISTWIDTH] IN BLOOD BY AUTOMATED COUNT: 13.3 % (ref 10–15)
HCT VFR BLD AUTO: 44.4 % (ref 35–47)
HGB BLD-MCNC: 13.9 G/DL (ref 11.7–15.7)
LYMPHOCYTES # BLD AUTO: 3 10E9/L (ref 0.8–5.3)
LYMPHOCYTES NFR BLD AUTO: 46.5 %
MCH RBC QN AUTO: 26.8 PG (ref 26.5–33)
MCHC RBC AUTO-ENTMCNC: 31.3 G/DL (ref 31.5–36.5)
MCV RBC AUTO: 86 FL (ref 78–100)
MONOCYTES # BLD AUTO: 0.5 10E9/L (ref 0–1.3)
MONOCYTES NFR BLD AUTO: 8.5 %
NEUTROPHILS # BLD AUTO: 2.7 10E9/L (ref 1.6–8.3)
NEUTROPHILS NFR BLD AUTO: 42 %
PLATELET # BLD AUTO: 263 10E9/L (ref 150–450)
RBC # BLD AUTO: 5.18 10E12/L (ref 3.8–5.2)
WBC # BLD AUTO: 6.3 10E9/L (ref 4–11)

## 2020-03-10 PROCEDURE — 99214 OFFICE O/P EST MOD 30 MIN: CPT | Performed by: NURSE PRACTITIONER

## 2020-03-10 PROCEDURE — 80050 GENERAL HEALTH PANEL: CPT | Performed by: NURSE PRACTITIONER

## 2020-03-10 PROCEDURE — 36415 COLL VENOUS BLD VENIPUNCTURE: CPT | Performed by: NURSE PRACTITIONER

## 2020-03-10 PROCEDURE — 80061 LIPID PANEL: CPT | Performed by: NURSE PRACTITIONER

## 2020-03-10 RX ORDER — AMLODIPINE BESYLATE 5 MG/1
5 TABLET ORAL DAILY
Qty: 90 TABLET | Refills: 3 | Status: SHIPPED | OUTPATIENT
Start: 2020-03-10 | End: 2021-03-25

## 2020-03-10 RX ORDER — ATORVASTATIN CALCIUM 20 MG/1
20 TABLET, FILM COATED ORAL DAILY
Qty: 90 TABLET | Refills: 3 | Status: SHIPPED | OUTPATIENT
Start: 2020-03-10 | End: 2021-07-01

## 2020-03-10 ASSESSMENT — MIFFLIN-ST. JEOR: SCORE: 1526.05

## 2020-03-10 NOTE — NURSING NOTE
"Chief Complaint   Patient presents with     Recheck Medication     initial /72   Pulse 68   Temp 98  F (36.7  C) (Oral)   Resp 16   Ht 1.727 m (5' 8\")   Wt 95.3 kg (210 lb)   SpO2 99%   BMI 31.93 kg/m   Estimated body mass index is 31.93 kg/m  as calculated from the following:    Height as of this encounter: 1.727 m (5' 8\").    Weight as of this encounter: 95.3 kg (210 lb)..  bp completed using cuff size large  HELENE GIBSON LPN  "

## 2020-03-10 NOTE — PROGRESS NOTES
.Subjective     Concepcion Schumacher is a 70 year old female who presents to clinic today for the following health issues:    HPI   Hyperlipidemia Follow-Up      Are you regularly taking any medication or supplement to lower your cholesterol?   Yes- atorvastatin    Are you having muscle aches or other side effects that you think could be caused by your cholesterol lowering medication?  No    Hypertension Follow-up      Do you check your blood pressure regularly outside of the clinic? Yes     Are you following a low salt diet? Yes    Are your blood pressures ever more than 140 on the top number (systolic) OR more   than 90 on the bottom number (diastolic), for example 140/90? No      How many servings of fruits and vegetables do you eat daily?  2-3    On average, how many sweetened beverages do you drink each day (Examples: soda, juice, sweet tea, etc.  Do NOT count diet or artificially sweetened beverages)?   0    How many days per week do you exercise enough to make your heart beat faster? 3 or less    How many minutes a day do you exercise enough to make your heart beat faster? 30 - 60    How many days per week do you miss taking your medication? 0        Patient Active Problem List   Diagnosis     Hypercalcemia     Primary hyperparathyroidism (H)     Hyperparathyroidism (H)     CARDIOVASCULAR SCREENING; LDL GOAL LESS THAN 160     Mantoux: positive     Nausea and vomiting     Essential hypertension     Past Surgical History:   Procedure Laterality Date     PARATHYROIDECTOMY  7/3/2012    Procedure: PARATHYROIDECTOMY;  Neck exploration with Excision parathyroid adenoma, with rapid PTH assay, preoperative sestimeby injection;  Surgeon: Jaclyn Sanchez MD;  Location:  OR       Social History     Tobacco Use     Smoking status: Never Smoker     Smokeless tobacco: Never Used   Substance Use Topics     Alcohol use: No     Family History   Problem Relation Age of Onset     Hypertension Sister      Family History Negative  "Mother      Family History Negative Father              Reviewed and updated as needed this visit by Provider  Tobacco  Allergies  Meds  Problems  Med Hx  Surg Hx  Fam Hx         Review of Systems   ROS COMP: Constitutional, HEENT, cardiovascular, pulmonary, GI, , musculoskeletal, neuro, skin, endocrine and psych systems are negative, except as otherwise noted.      Objective    /72   Pulse 68   Temp 98  F (36.7  C) (Oral)   Resp 16   Ht 1.727 m (5' 8\")   Wt 95.3 kg (210 lb)   SpO2 99%   BMI 31.93 kg/m    Body mass index is 31.93 kg/m .  Physical Exam   GENERAL: alert and no distress- here with male relative who speaks for her   RESP: lungs clear to auscultation - no rales, rhonchi or wheezes  CV: regular rate and rhythm  ABDOMEN: soft, nontender, and bowel sounds normal  MS: no gross musculoskeletal defects noted, no edema  NEURO: Normal strength and tone, mentation intact and speech normal  PSYCH: mentation appears normal, affect normal/bright    Diagnostic Test Results:  Labs reviewed in [facility]  Lab         Assessment & Plan     1. Essential hypertension  In good range on current medication   - Comprehensive metabolic panel  - TSH with free T4 reflex  - CBC with platelets and differential    2. CARDIOVASCULAR SCREENING; LDL GOAL LESS THAN 160  Stable on medication     3. Hypercholesteremia  Tolerating medication   - Lipid panel reflex to direct LDL Fasting  - Comprehensive metabolic panel  - atorvastatin (LIPITOR) 20 MG tablet; Take 1 tablet (20 mg) by mouth daily  Dispense: 90 tablet; Refill: 3    4. Essential hypertension, benign    - amLODIPine (NORVASC) 5 MG tablet; Take 1 tablet (5 mg) by mouth daily  Dispense: 90 tablet; Refill: 3     BMI:   Estimated body mass index is 31.93 kg/m  as calculated from the following:    Height as of this encounter: 1.727 m (5' 8\").    Weight as of this encounter: 95.3 kg (210 lb).           Patient Instructions   Lab in [address]    Medication refills sent " to pharmacy       Return in about 1 year (around 3/10/2021).    HOLLY Sylvester [facility]

## 2020-03-11 DIAGNOSIS — E78.00 HYPERCHOLESTEREMIA: Primary | ICD-10-CM

## 2020-03-11 LAB
ALBUMIN SERPL-MCNC: 3.2 G/DL (ref 3.4–5)
ALP SERPL-CCNC: 92 U/L (ref 40–150)
ALT SERPL W P-5'-P-CCNC: 20 U/L (ref 0–50)
ANION GAP SERPL CALCULATED.3IONS-SCNC: 5 MMOL/L (ref 3–14)
AST SERPL W P-5'-P-CCNC: 21 U/L (ref 0–45)
BILIRUB SERPL-MCNC: 0.3 MG/DL (ref 0.2–1.3)
BUN SERPL-MCNC: 10 MG/DL (ref 7–30)
CALCIUM SERPL-MCNC: 8.6 MG/DL (ref 8.5–10.1)
CHLORIDE SERPL-SCNC: 104 MMOL/L (ref 94–109)
CHOLEST SERPL-MCNC: 241 MG/DL
CO2 SERPL-SCNC: 28 MMOL/L (ref 20–32)
CREAT SERPL-MCNC: 0.8 MG/DL (ref 0.52–1.04)
GFR SERPL CREATININE-BSD FRML MDRD: 75 ML/MIN/{1.73_M2}
GLUCOSE SERPL-MCNC: 109 MG/DL (ref 70–99)
HDLC SERPL-MCNC: 35 MG/DL
LDLC SERPL CALC-MCNC: 147 MG/DL
NONHDLC SERPL-MCNC: 206 MG/DL
POTASSIUM SERPL-SCNC: 4.3 MMOL/L (ref 3.4–5.3)
PROT SERPL-MCNC: 8.6 G/DL (ref 6.8–8.8)
SODIUM SERPL-SCNC: 137 MMOL/L (ref 133–144)
TRIGL SERPL-MCNC: 295 MG/DL
TSH SERPL DL<=0.005 MIU/L-ACNC: 1.74 MU/L (ref 0.4–4)

## 2020-03-11 RX ORDER — ROSUVASTATIN CALCIUM 20 MG/1
20 TABLET, COATED ORAL DAILY
Qty: 90 TABLET | Refills: 3 | Status: SHIPPED | OUTPATIENT
Start: 2020-03-11 | End: 2021-07-01 | Stop reason: DRUGHIGH

## 2020-05-28 ENCOUNTER — VIRTUAL VISIT (OUTPATIENT)
Dept: INTERNAL MEDICINE | Facility: CLINIC | Age: 70
End: 2020-05-28

## 2020-05-28 DIAGNOSIS — N64.4 BREAST PAIN, LEFT: Primary | ICD-10-CM

## 2020-05-28 PROCEDURE — 99213 OFFICE O/P EST LOW 20 MIN: CPT | Mod: 95 | Performed by: NURSE PRACTITIONER

## 2020-05-28 NOTE — PATIENT INSTRUCTIONS
diagnostic mammogram and ultrasound     If pain persists may be muscular and may need in person appointment to assess

## 2020-05-28 NOTE — PROGRESS NOTES
".Concepcion Schumacher is a 70 year old female who is being evaluated via a billable telephone visit.      The patient has been notified of following:     \"This telephone visit will be conducted via a call between you and your physician/provider. We have found that certain health care needs can be provided without the need for a physical exam.  This service lets us provide the care you need with a short phone conversation.  If a prescription is necessary we can send it directly to your pharmacy.  If lab work is needed we can place an order for that and you can then stop by our lab to have the test done at a later time.    Telephone visits are billed at different rates depending on your insurance coverage. During this emergency period, for some insurers they may be billed the same as an in-person visit.  Please reach out to your insurance provider with any questions.    If during the course of the call the physician/provider feels a telephone visit is not appropriate, you will not be charged for this service.\"    Patient has given verbal consent for Telephone visit?  Yes    What phone number would you like to be contacted at? [phone]  will answer phone.     How would you like to obtain your AVS? Mail a copy    Subjective     Concepcion Schumacher is a 70 year old female who presents via phone visit today for the following health issues:    HPI   Chief Complaint   Patient presents with     Pain     pain goes in to left side of neck down in to her breast intermitent for last 2 weeks.   Left breast pain   Neck pain where shoulder come together - does not hurt to touch area - massage helps   pain on and off -maybe every other day - lasts about 30 minute and then  Goes away   Neck to breast pain when happens  Side of breast on side of armpit     Denies any lumps in breast or supraclavicular area                Patient Active Problem List   Diagnosis     Hypercalcemia     Primary hyperparathyroidism (H)     " Hyperparathyroidism (H)     CARDIOVASCULAR SCREENING; LDL GOAL LESS THAN 160     Mantoux: positive     Nausea and vomiting     Essential hypertension     Past Surgical History:   Procedure Laterality Date     PARATHYROIDECTOMY  7/3/2012    Procedure: PARATHYROIDECTOMY;  Neck exploration with Excision parathyroid adenoma, with rapid PTH assay, preoperative sestimeby injection;  Surgeon: Jaclyn Sanchez MD;  Location:  OR       Social History     Tobacco Use     Smoking status: Never Smoker     Smokeless tobacco: Never Used   Substance Use Topics     Alcohol use: No     Family History   Problem Relation Age of Onset     Hypertension Sister      Family History Negative Mother      Family History Negative Father            Reviewed and updated as needed this visit by Provider  Tobacco  Allergies  Meds  Problems  Med Hx  Surg Hx  Fam Hx         Review of Systems   Constitutional, HEENT, cardiovascular, pulmonary, gi and gu systems are negative, except as otherwise noted.       Objective   Reported vitals:  There were no vitals taken for this visit.   alert and no distress- male  for her at home   PSYCH: Alert and oriented times 3; coherent speech, normal   rate and volume, able to articulate logical thoughts, able   to abstract reason, no tangential thoughts, no hallucinations   or delusions  Her affect is normal  RESP: No cough, no audible wheezing, able to talk in full sentences  Remainder of exam unable to be completed due to telephone visits    Diagnostic Test Results:  Mammogram   Ultrasound         Assessment/Plan:  1. Breast pain, left    - MA Diagnostic Digital Bilateral; Future  - US Breast Left Complete 4 Quadrants; Future    Return in about 4 weeks (around 6/25/2020) for diagnostic mammogram and ultrasound .      Phone call duration:  12 minutes    HOLLY Sylvester CNP

## 2020-05-28 NOTE — NURSING NOTE
"Chief Complaint   Patient presents with     Pain     pain goes in to left side of neck down in to her breast intermitent for last 2 weeks.     initial There were no vitals taken for this visit. Estimated body mass index is 31.93 kg/m  as calculated from the following:    Height as of 3/10/20: 1.727 m (5' 8\").    Weight as of 3/10/20: 95.3 kg (210 lb)..  bp completed using cuff size NA (Not Taken)  HELENE GIBSON LPN  "

## 2020-05-29 ENCOUNTER — HOSPITAL ENCOUNTER (OUTPATIENT)
Dept: MAMMOGRAPHY | Facility: CLINIC | Age: 70
End: 2020-05-29
Attending: NURSE PRACTITIONER

## 2020-05-29 DIAGNOSIS — N64.4 BREAST PAIN, LEFT: ICD-10-CM

## 2020-05-29 PROCEDURE — G0279 TOMOSYNTHESIS, MAMMO: HCPCS

## 2020-08-04 ENCOUNTER — TELEPHONE (OUTPATIENT)
Dept: INTERNAL MEDICINE | Facility: CLINIC | Age: 70
End: 2020-08-04

## 2020-08-04 ENCOUNTER — VIRTUAL VISIT (OUTPATIENT)
Dept: INTERNAL MEDICINE | Facility: CLINIC | Age: 70
End: 2020-08-04

## 2020-08-04 DIAGNOSIS — R05.9 COUGH: Primary | ICD-10-CM

## 2020-08-04 DIAGNOSIS — R61 ABNORMAL FLUSHING AND SWEATING: ICD-10-CM

## 2020-08-04 DIAGNOSIS — R23.2 ABNORMAL FLUSHING AND SWEATING: ICD-10-CM

## 2020-08-04 DIAGNOSIS — R42 DIZZINESS: ICD-10-CM

## 2020-08-04 PROCEDURE — 99213 OFFICE O/P EST LOW 20 MIN: CPT | Mod: 95 | Performed by: NURSE PRACTITIONER

## 2020-08-04 NOTE — TELEPHONE ENCOUNTER
Patients Gracia calling  They miss understood that they would need to call for a COVID test. Please place orders. Patient given number to call and schedule

## 2020-08-04 NOTE — NURSING NOTE
"Chief Complaint   Patient presents with     Cough     pt has had a dry cough x 5 days and using nyquil and not getting better , afebrile, nausea.     initial There were no vitals taken for this visit. Estimated body mass index is 31.93 kg/m  as calculated from the following:    Height as of 3/10/20: 1.727 m (5' 8\").    Weight as of 3/10/20: 95.3 kg (210 lb)..  bp completed using cuff size NA (Not Taken)  HELENE GIBSON LPN  "

## 2020-08-04 NOTE — PROGRESS NOTES
".Concepcion Schumacher is a 70 year old female who is being evaluated via a billable telephone visit.      The patient has been notified of following:     \"This telephone visit will be conducted via a call between you and your physician/provider. We have found that certain health care needs can be provided without the need for a physical exam.  This service lets us provide the care you need with a short phone conversation.  If a prescription is necessary we can send it directly to your pharmacy.  If lab work is needed we can place an order for that and you can then stop by our lab to have the test done at a later time.    Telephone visits are billed at different rates depending on your insurance coverage. During this emergency period, for some insurers they may be billed the same as an in-person visit.  Please reach out to your insurance provider with any questions.    If during the course of the call the physician/provider feels a telephone visit is not appropriate, you will not be charged for this service.\"    Patient has given verbal consent for Telephone visit?  Yes    What phone number would you like to be contacted at? [phone] alejandra her daughter is interpreting  How would you like to obtain your AVS? Mail a copy    Subjective     Concepcion Schumacher is a 70 year old female who presents via phone visit today for the following health issues:    HPI   Chief Complaint   Patient presents with     Cough     pt has had a dry cough x 5 days and using nyquil and not getting better , afebrile, nausea.   Cough is nonproductive   No fever  Sweat comes and goes   No SOB   Some dizziness at times   Not drinking as much as she should per daughter     Lives with 3 other people who are not ill or coughing     Daughter only off today - she wants to do COVID test and I think she needs to be seen to have lungs assessed so she will bring her to ER     Patient Active Problem List   Diagnosis     Hypercalcemia     Primary hyperparathyroidism " (H)     Hyperparathyroidism (H)     CARDIOVASCULAR SCREENING; LDL GOAL LESS THAN 160     Mantoux: positive     Nausea and vomiting     Essential hypertension     Past Surgical History:   Procedure Laterality Date     PARATHYROIDECTOMY  7/3/2012    Procedure: PARATHYROIDECTOMY;  Neck exploration with Excision parathyroid adenoma, with rapid PTH assay, preoperative sestimeby injection;  Surgeon: Jaclyn Sanchez MD;  Location:  OR       Social History     Tobacco Use     Smoking status: Never Smoker     Smokeless tobacco: Never Used   Substance Use Topics     Alcohol use: No     Family History   Problem Relation Age of Onset     Hypertension Sister      Family History Negative Mother      Family History Negative Father            Reviewed and updated as needed this visit by Provider  Tobacco  Allergies  Meds  Problems  Med Hx  Surg Hx  Fam Hx         Review of Systems   Constitutional, HEENT, cardiovascular, pulmonary, GI, , musculoskeletal, neuro, skin, endocrine and psych systems are negative, except as otherwise noted.       Objective   Reported vitals:  There were no vitals taken for this visit.   alert and mild distress  PSYCH: Alert and oriented times 3; coherent speech, normal   rate and volume, able to articulate logical thoughts, able   to abstract reason, no tangential thoughts, no hallucinations   or delusions  Her affect is normal  RESP: No cough, no audible wheezing, able to talk in full sentences  Remainder of exam unable to be completed due to telephone visits    Diagnostic Test Results:        Assessment/Plan:    1. Cough  For several days non productive with sweats at times   No fever   Keeps her up at night  covid testing needed - daughter only has today to take her   She will go to ER for covid and lung screening        2. Abnormal flushing and sweating      3. Dizziness  Intermittent - may be related to hydration       Return in about 1 week (around 8/11/2020) for covid testing and  lung assessment .      Phone call duration:  14 minutes    HOLLY Sylvester CNP

## 2020-08-05 ENCOUNTER — HOSPITAL ENCOUNTER (EMERGENCY)
Facility: CLINIC | Age: 70
Discharge: HOME | End: 2020-08-05
Attending: EMERGENCY MEDICINE | Admitting: EMERGENCY MEDICINE
Payer: OTHER GOVERNMENT

## 2020-08-05 ENCOUNTER — NURSE TRIAGE (OUTPATIENT)
Dept: INTERNAL MEDICINE | Facility: CLINIC | Age: 70
End: 2020-08-05

## 2020-08-05 ENCOUNTER — APPOINTMENT (OUTPATIENT)
Dept: GENERAL RADIOLOGY | Facility: CLINIC | Age: 70
End: 2020-08-05
Attending: EMERGENCY MEDICINE
Payer: OTHER GOVERNMENT

## 2020-08-05 VITALS
SYSTOLIC BLOOD PRESSURE: 149 MMHG | HEART RATE: 67 BPM | DIASTOLIC BLOOD PRESSURE: 70 MMHG | TEMPERATURE: 97.7 F | RESPIRATION RATE: 18 BRPM | OXYGEN SATURATION: 100 %

## 2020-08-05 DIAGNOSIS — J18.9 PNEUMONIA OF RIGHT UPPER LOBE DUE TO INFECTIOUS ORGANISM: ICD-10-CM

## 2020-08-05 DIAGNOSIS — R07.9 CHEST PAIN, UNSPECIFIED TYPE: ICD-10-CM

## 2020-08-05 DIAGNOSIS — Z20.822 SUSPECTED COVID-19 VIRUS INFECTION: ICD-10-CM

## 2020-08-05 LAB
ALBUMIN SERPL-MCNC: 2.8 G/DL (ref 3.4–5)
ALP SERPL-CCNC: 98 U/L (ref 40–150)
ALT SERPL W P-5'-P-CCNC: 34 U/L (ref 0–50)
ANION GAP SERPL CALCULATED.3IONS-SCNC: 4 MMOL/L (ref 3–14)
AST SERPL W P-5'-P-CCNC: 32 U/L (ref 0–45)
BASOPHILS # BLD AUTO: 0 10E9/L (ref 0–0.2)
BASOPHILS NFR BLD AUTO: 0.5 %
BILIRUB SERPL-MCNC: 0.5 MG/DL (ref 0.2–1.3)
BUN SERPL-MCNC: 11 MG/DL (ref 7–30)
CALCIUM SERPL-MCNC: 8.8 MG/DL (ref 8.5–10.1)
CHLORIDE SERPL-SCNC: 104 MMOL/L (ref 94–109)
CO2 SERPL-SCNC: 28 MMOL/L (ref 20–32)
CREAT SERPL-MCNC: 0.78 MG/DL (ref 0.52–1.04)
DIFFERENTIAL METHOD BLD: ABNORMAL
EOSINOPHIL # BLD AUTO: 0.1 10E9/L (ref 0–0.7)
EOSINOPHIL NFR BLD AUTO: 1.6 %
ERYTHROCYTE [DISTWIDTH] IN BLOOD BY AUTOMATED COUNT: 13.2 % (ref 10–15)
GFR SERPL CREATININE-BSD FRML MDRD: 77 ML/MIN/{1.73_M2}
GLUCOSE SERPL-MCNC: 89 MG/DL (ref 70–99)
HCT VFR BLD AUTO: 40.3 % (ref 35–47)
HGB BLD-MCNC: 12.6 G/DL (ref 11.7–15.7)
IMM GRANULOCYTES # BLD: 0 10E9/L (ref 0–0.4)
IMM GRANULOCYTES NFR BLD: 0.5 %
LYMPHOCYTES # BLD AUTO: 2.8 10E9/L (ref 0.8–5.3)
LYMPHOCYTES NFR BLD AUTO: 37 %
MCH RBC QN AUTO: 27.4 PG (ref 26.5–33)
MCHC RBC AUTO-ENTMCNC: 31.3 G/DL (ref 31.5–36.5)
MCV RBC AUTO: 88 FL (ref 78–100)
MONOCYTES # BLD AUTO: 0.6 10E9/L (ref 0–1.3)
MONOCYTES NFR BLD AUTO: 8.4 %
NEUTROPHILS # BLD AUTO: 4 10E9/L (ref 1.6–8.3)
NEUTROPHILS NFR BLD AUTO: 52 %
NRBC # BLD AUTO: 0 10*3/UL
NRBC BLD AUTO-RTO: 0 /100
PLATELET # BLD AUTO: 436 10E9/L (ref 150–450)
POTASSIUM SERPL-SCNC: 3.7 MMOL/L (ref 3.4–5.3)
PROT SERPL-MCNC: 8.5 G/DL (ref 6.8–8.8)
RBC # BLD AUTO: 4.6 10E12/L (ref 3.8–5.2)
SODIUM SERPL-SCNC: 136 MMOL/L (ref 133–144)
TROPONIN I SERPL-MCNC: <0.015 UG/L (ref 0–0.04)
WBC # BLD AUTO: 7.7 10E9/L (ref 4–11)

## 2020-08-05 PROCEDURE — 25000132 ZZH RX MED GY IP 250 OP 250 PS 637: Performed by: EMERGENCY MEDICINE

## 2020-08-05 PROCEDURE — U0003 INFECTIOUS AGENT DETECTION BY NUCLEIC ACID (DNA OR RNA); SEVERE ACUTE RESPIRATORY SYNDROME CORONAVIRUS 2 (SARS-COV-2) (CORONAVIRUS DISEASE [COVID-19]), AMPLIFIED PROBE TECHNIQUE, MAKING USE OF HIGH THROUGHPUT TECHNOLOGIES AS DESCRIBED BY CMS-2020-01-R: HCPCS | Performed by: EMERGENCY MEDICINE

## 2020-08-05 PROCEDURE — 99285 EMERGENCY DEPT VISIT HI MDM: CPT | Mod: 25

## 2020-08-05 PROCEDURE — 84484 ASSAY OF TROPONIN QUANT: CPT | Performed by: EMERGENCY MEDICINE

## 2020-08-05 PROCEDURE — 85025 COMPLETE CBC W/AUTO DIFF WBC: CPT | Performed by: EMERGENCY MEDICINE

## 2020-08-05 PROCEDURE — 80053 COMPREHEN METABOLIC PANEL: CPT | Performed by: EMERGENCY MEDICINE

## 2020-08-05 PROCEDURE — 93005 ELECTROCARDIOGRAM TRACING: CPT

## 2020-08-05 PROCEDURE — 71045 X-RAY EXAM CHEST 1 VIEW: CPT

## 2020-08-05 PROCEDURE — C9803 HOPD COVID-19 SPEC COLLECT: HCPCS

## 2020-08-05 RX ORDER — ASPIRIN 81 MG/1
324 TABLET, CHEWABLE ORAL ONCE
Status: COMPLETED | OUTPATIENT
Start: 2020-08-05 | End: 2020-08-05

## 2020-08-05 RX ORDER — AZITHROMYCIN 250 MG/1
TABLET, FILM COATED ORAL
Qty: 6 TABLET | Refills: 0 | Status: SHIPPED | OUTPATIENT
Start: 2020-08-05 | End: 2020-08-10

## 2020-08-05 RX ADMIN — ASPIRIN 81 MG 324 MG: 81 TABLET ORAL at 12:15

## 2020-08-05 ASSESSMENT — ENCOUNTER SYMPTOMS
COUGH: 1
SHORTNESS OF BREATH: 0
CHEST TIGHTNESS: 1
DIAPHORESIS: 1
FEVER: 0
CHILLS: 0

## 2020-08-05 NOTE — ED AVS SNAPSHOT
[facility] Emergency Department  [address]  [facility] [address]  Phone:  [phone]  Fax:  [phone]                                    Concepcion Schumacher   MRN: [id number]    Department:  [facility] Emergency Department   Date of Visit:  8/5/2020           After Visit Summary Signature Page    I have received my discharge instructions, and my questions have been answered. I have discussed any challenges I see with this plan with the nurse or doctor.    ..........................................................................................................................................  Patient/Patient Representative Signature      ..........................................................................................................................................  Patient Representative Print Name and Relationship to Patient    ..................................................               ................................................  Date                                   Time    ..........................................................................................................................................  Reviewed by Signature/Title    ...................................................              ..............................................  Date                                               Time          22EPIC Rev 08/18

## 2020-08-05 NOTE — ED PROVIDER NOTES
"  History   Chief Complaint:  Cough     HPI   Concepcion Schumacher is a 70 year old female with history of hypertension who presents for evaluation of a cough, associated with diaphoresis, that worsened over the last 3 days. The patient states 5 days ago she began having an intermittent cough, which has become more constant over the last 3 days. She also endorses occasionally getting sweat. Last night she had a \"squeezing feeling\" on the right side of her chest. This is no longer present. Uncertain how long it lasted. Out of concern for symptoms, she presented for evaluation.    Here, the patient denies any measured fever, chills, or shortness of breath.     She refused [facility] interpretor.     Allergies:  Cefdinir  Hydrocodone    Medications:   Norvasc  Lipitor   Crestor     Past Medical History:    Hyperparathyroidism  Hypertension      Past Surgical History:    Parathyroidectomy      Family History:    Sister - Hypertension     Social History:  The patient was unaccompanied to the ED.  Smoking Status: Never Smoker  Smokeless Tobacco: Never Used  Alcohol Use: No  Drug Use: No  PCP: Diana Phillips     Review of Systems   Constitutional: Positive for diaphoresis. Negative for chills and fever.   Respiratory: Positive for cough and chest tightness. Negative for shortness of breath.    Cardiovascular: Positive for chest pain.   All other systems reviewed and are negative.      Physical Exam     Patient Vitals for the past 24 hrs:   BP Temp Temp src Pulse Heart Rate Resp SpO2   08/05/20 1300 134/72 -- -- 62 61 17 100 %   08/05/20 1130 (!) 148/88 97.7  F (36.5  C) Oral -- 78 20 99 %     Physical Exam    Nursing note and vitals reviewed.    Constitutional: Pleasant and well groomed.          HENT:    Mouth/Throat: Oropharynx is without swelling or erythema. Oral mucosa moist.    Eyes: Conjunctivae are normal. No scleral icterus.    Neck: Neck supple.   Cardiovascular: Normal rate, regular rhythm and intact distal pulses.  "   Pulmonary/Chest: Effort normal and breath sounds normal.   Abdominal: Soft.  No distension. There is no tenderness.   Musculoskeletal:  No edema, No calf tenderness  Neurological:Alert and oriented. Coordination normal. moving all extremities symmetrically.   Skin: Skin is warm and dry.   Psychiatric: Normal mood and affect.       Emergency Department Course     ECG:  ECG taken at 1210, ECG read at 1212 by Erin Quiroz MD  Normal sinus  Normal ECG   No significant change compared to EKG dated 12/16/17  Rate 69 bpm. NH interval 168. QRS duration 84. QT/QTc 404/432. P-R-T axes 62 48 36.      Imaging:  Radiology findings were communicated with the patient who voiced understanding of the findings.    XR Chest Port 1 View   IMPRESSION:  Mild hazy right upper lobe infiltrate/pneumonia.  Reading per radiology.      Laboratory:  Laboratory findings were communicated with the patient who voiced understanding of the findings.    CBC: AWNL (WBC 7.7, HGB 12.6, )  CMP: Albumin 2.8 (L) o/w WNL (Creatinine 0.78)   Troponin (Collected 1209): <0.015     COVID-19 Virus (Coronavirus) by PCR: Pending.      Interventions:  1215 Aspirin 324 mg PO    Emergency Department Course:  Past medical records, nursing notes, and vitals reviewed.  EKG obtained in the ED, see results above.    The patient was sent for a XR Chest Port 1 View while in the emergency department, results above.    IV was inserted and blood was drawn for laboratory testing, results above.   A nasal swab was obtained for laboratory testing, findings above.      (1139)   I performed an exam of the patient as documented above. History obtained from patient.     (1350)   I rechecked the patient and discussed results and plan of care. Patient requesting that I talk with her daughter.  Refuses interpretor. Attempted to call daughter. She did not answer phone. I also tried to call did not answer. Left message.     (1530) After caring for critical patient  returned to evaluated. Attempted to call daughter again, still no answer. Patient states daughter in is parking lot. I searched and eventually found her. Due to patients distress about speaking with her daughter I brought her back to ED. Patient still not registered. Assisted daughter to call registration. Tech agreed to bring  Phone to room.      Findings and plan explained to the Patient and daughter. Both feel comfortable with plan for discharge home. Patient discharged home with instructions regarding supportive care, medications, and reasons to return. The importance of close follow-up was reviewed. The patient was prescribed Zithromax and Augmentin. I personally reviewed the laboratory and imaging results with the Patient and answered all related questions prior to discharge.     Impression & Plan     Covid-19  Concepcion Schumacher was evaluated during a global COVID-19 pandemic, which necessitated consideration that the patient might be at risk for infection with the SARS-CoV-2 virus that causes COVID-19.   Applicable protocols for evaluation were followed during the patient's care.   COVID-19 was considered as part of the patient's evaluation. The plan for testing is:  a test was obtained during this visit.    Medical Decision Making:  Concepcion Schumacher is a 70 year old female  Who presents with 5 days of cough and intermittent sweats. No known COVID exposure but daughter works as CRNA. The differential diagnosis included but was not limited to pneumonia, COVID less likely ACS. ED evaluation is a noted above and remarkable for an early Right sided infiltrate. COVID testing is pending at this time. Due to the infiltrate antibiotics are indicated to treat for CAP however the patient and her daughter understand that COVID is still possible. At this time the patient does not require admission. They understand to initiate antibiotics immediately and return to the ED with new/worsening symptoms and follow up in 2 days if COVID  negative and not improving as anticipated. Otherwise they will follow up after completion of the antibiotics for repeat assessment. COVID isolation was recommended.       Diagnosis:    ICD-10-CM    1. Pneumonia of right upper lobe due to infectious organism  J18.9    2. Chest pain, unspecified type  R07.9     likely related to the pneumonia     Disposition:  Discharged to home.    Discharge Medications:     Medication List      Started    amoxicillin-clavulanate 875-125 MG tablet  Commonly known as:  AUGMENTIN  1 tablet, Oral, 2 TIMES DAILY     azithromycin 250 MG tablet  Commonly known as:  Zithromax Z-Nick  Two tablets on the first day, then one tablet daily for the next 4 days             Scribe Disclosure:  Iam STEVENS, am serving as a scribe at 11:38 AM on 8/5/2020 to document services personally performed by Erin Quiroz MD based on my observations and the provider's statements to me.  August 5, 2020   [facility] EMERGENCY DEPARTMENT        Erin Quiroz MD  08/05/20 1956

## 2020-08-05 NOTE — TELEPHONE ENCOUNTER
Gracia her daughter is calling and said she took her mom to the ED yesterday and they refused to see her. They said she needed an appointment and sent her away. Gracia said they were treated very poorly at the ED.

## 2020-08-05 NOTE — DISCHARGE INSTRUCTIONS
Discharge Instructions  Bronchitis, Pneumonia, Bronchospasm    You were seen today for a chest infection or inflammation. If your provider decided this was due to a bacterial infection, you may need an antibiotic. Sometimes these are caused by a virus, and then an antibiotic will not help.     Generally, every Emergency Department visit should have a follow-up clinic visit with either a primary or a specialty clinic/provider. Please follow-up as instructed by your emergency provider today.    Return to the Emergency Department if:  Your breathing gets much worse.  You are very weak, or feel much more ill.  You develop new symptoms, such as chest pain.  You cough up blood.  You are vomiting (throwing up) enough that you cannot keep fluids or your medicine down.    What can I do to help myself?  Fill any prescriptions the provider gave you and take them right away--especially antibiotics. Be sure to finish the whole antibiotic prescription.  You may be given a prescription for an inhaler, which can help loosen tight air passages.  Use this as needed, but not more often than directed. Inhalers work much better when used with a spacer.   You may be given a prescription for a steroid to reduce inflammation. Used long-term, these can have side effects, but for short-term use they are safe. You may notice restlessness or increased appetite.      You may use non-prescription cough or cold medicines. Cough medicines may help, but don t make the cough go away completely.   Avoid smoke, because this can make your symptoms worse. If you smoke, this may be a good time to quit! Consider using nicotine lozenges, gum, or patches to reduce cravings.   If you have a fever, Tylenol  (acetaminophen), Motrin  (ibuprofen), or Advil  (ibuprofen) may help bring fever down and may help you feel more comfortable. Be sure to read and follow the package directions, and ask your provider if you have questions.  Be sure to get your flu shot each  year.  For certain ages, the pneumonia shot can help prevent pneumonia.  If you were given a prescription for medicine here today, be sure to read all of the information (including the package insert) that comes with your prescription.  This will include important information about the medicine, its side effects, and any warnings that you need to know about.  The pharmacist who fills the prescription can provide more information and answer questions you may have about the medicine.  If you have questions or concerns that the pharmacist cannot address, please call or return to the Emergency Department.     Remember that you can always come back to the Emergency Department if you are not able to see your regular provider in the amount of time listed above, if you get any new symptoms, or if there is anything that worries you.  Discharge Instructions  COVID-19    Your Provider has determined that you should practice self-isolation and self-monitoring in order to protect yourself and your community from COVID-19, which is the disease caused by a new coronavirus. The virus spreads from person to person primarily by droplets when an infected person coughs or sneezes and the droplet either lands on another person or that other person touches a surface with the droplet on it. Diagnosis of COVID-19 can be made with a test but many times the test is unavailable or not necessary. There is no specific treatment or medicine for the disease.    Symptoms of COVID-19    Many people have no symptoms or mild symptoms.  Symptoms may usually appear 4 to 5 days (up to 14 days) after contact with another ill person. Some people will get severe symptoms and pneumonia. Usual symptoms are:     ? Fever  ? Cough  ? Trouble breathing  ? Less common symptoms are: Headache, body aches, sore throat, sneezing, diarrhea, loss of taste or smell.    How to Care for Yourself Stay home.      Most people will recover from illness with mild symptoms.   Isolation by staying home is the best method to prevent the spread of the illness. Do not go to work or school. Have a friend or relative do your shopping. Do not use public transportation (bus, train) or ridesharing (Lyft, Uber).    How long should I stay home?    If you have symptoms of COVID, you should stay home for at least 10 days, and for 24 hours with no fever and improvement of symptoms--whichever is longer. (Your fever should be gone for 24 hours without using fever-reducing medicine)  For example, if you have a fever and cough for 6 days, you need to stay home 4 more days with no fever for a total of 10 days. Or, if you have a fever and cough for 10 days, you need to stay home one more day with no fever for a total of 11 days.    Separate yourself from other people in your home.?As much as possible, you should stay in one room and away from other people in your home. Also, use a separate bathroom, if possible. Avoid handling pets or other animals while sick.     Wear a facemask if you need to be around other people and cover your mouth and nose with a tissue when you cough or sneeze.     Avoid sharing personal household items. You should not share dishes, drinking glasses, forks/knives/spoons, towels, or bedding with other people in your home. After using these items, they should be washed with soap and water. Clean parts of your home that are touched often (doorknobs, faucets, countertops, etc.) daily.     Wash your hands often with soap and water for at least 20 seconds or use an alcohol-based hand  containing at least 60% alcohol.     Avoid touching your face.    Treat your symptoms. You can take Acetaminophen (Tylenol) to treat body aches and fever as needed for comfort. Ibuprofen (Advil or Motrin) can be used as well if you still have symptoms after taking Tylenol. Drink fluids. Rest.    Watch for worsening symptoms such as shortness of breath/difficulty breathing or very severe  weakness.    Employers/workplaces are being asked by the Centers for Disease Control (CDC) to not request notes/documentation for you to return to work or prove that you were ill. You may choose to show your employer this paperwork. Also, repeat testing should not be required to return to work.    Return to the Emergency Department if:    If you are developing worsening breathing, shortness of breath, or feel worse you should seek medical attention.  If you are uncertain, contact your health care provider/clinic. If you need emergency medical attention, call 911 and tell them you have been ill.

## 2020-08-05 NOTE — TELEPHONE ENCOUNTER
"Per 8/4/20 virtual visit note:  \"1. Cough  For several days non productive with sweats at times   No fever   Keeps her up at night  covid testing needed - daughter only has today to take her   She will go to ER for covid and lung screening\"    Call to daughter. She reports she brought patient to the ER yesterday, and was turned away and told that she needed an appointment. Writer attempted to get more information about where daughter brought patient yesterday such as a name or address, daughter unable to provide this information. Writer explained per provider's note, patient should be seen in the ER. Writer advised on nearest ER and gave daughter the addressed. Writer advised no appointment is needed in the ER. Writer advised daughter to call ahead of time to warn the ER that they are coming. Daughter will call back with further concerns.    "

## 2020-08-06 LAB
INTERPRETATION ECG - MUSE: NORMAL
SARS-COV-2 RNA SPEC QL NAA+PROBE: NOT DETECTED
SPECIMEN SOURCE: NORMAL

## 2021-03-22 DIAGNOSIS — I10 ESSENTIAL HYPERTENSION, BENIGN: ICD-10-CM

## 2021-03-24 NOTE — TELEPHONE ENCOUNTER
Pending Prescriptions:                       Disp   Refills    amLODIPine (NORVASC) 5 MG tablet [Pharmacy*90 tab*0        Sig: Take 1 tablet (5 mg) by mouth daily    Routing refill request to provider for review/approval because:  BP Readings from Last 3 Encounters:   08/05/20 (!) 149/70   03/10/20 136/72   01/11/19 136/62

## 2021-03-25 DIAGNOSIS — I10 ESSENTIAL HYPERTENSION, BENIGN: ICD-10-CM

## 2021-03-25 RX ORDER — AMLODIPINE BESYLATE 5 MG/1
5 TABLET ORAL DAILY
Qty: 90 TABLET | Refills: 0 | Status: SHIPPED | OUTPATIENT
Start: 2021-03-25 | End: 2021-07-01

## 2021-03-26 ENCOUNTER — TELEPHONE (OUTPATIENT)
Dept: INTERNAL MEDICINE | Facility: CLINIC | Age: 71
End: 2021-03-26

## 2021-03-26 RX ORDER — AMLODIPINE BESYLATE 5 MG/1
5 TABLET ORAL DAILY
Qty: 90 TABLET | Refills: 0 | OUTPATIENT
Start: 2021-03-26

## 2021-03-26 NOTE — TELEPHONE ENCOUNTER
Reason for call:  Medication   If this is a refill request, has the caller requested the refill from the pharmacy already? No  Will the patient be using a [facility]? No  Name of the pharmacy and phone number for the current request: [facility] [phone]    Name of the medication requested: Amlodipine    Other request: Please call once prescription is called to pharmacy.    Phone number to reach patient:  Cell number on file:    Telephone Information:   Mobile [phone]       Best Time:  ASAP    Can we leave a detailed message on this number?  YES    Travel screening: Not Applicable

## 2021-03-29 NOTE — TELEPHONE ENCOUNTER
Left voice message for patient that Amlodipine was refilled on 3/25/21 to [facility]. Advised her to contact the pharmacy if she has not been notified the prescription is ready for .

## 2021-03-30 DIAGNOSIS — I10 ESSENTIAL HYPERTENSION, BENIGN: ICD-10-CM

## 2021-04-01 RX ORDER — AMLODIPINE BESYLATE 5 MG/1
5 TABLET ORAL DAILY
Qty: 90 TABLET | Refills: 0 | OUTPATIENT
Start: 2021-04-01

## 2021-07-01 ENCOUNTER — OFFICE VISIT (OUTPATIENT)
Dept: INTERNAL MEDICINE | Facility: CLINIC | Age: 71
End: 2021-07-01

## 2021-07-01 VITALS
BODY MASS INDEX: 31.83 KG/M2 | DIASTOLIC BLOOD PRESSURE: 64 MMHG | OXYGEN SATURATION: 100 % | HEIGHT: 68 IN | RESPIRATION RATE: 22 BRPM | HEART RATE: 73 BPM | TEMPERATURE: 97.9 F | SYSTOLIC BLOOD PRESSURE: 136 MMHG | WEIGHT: 210 LBS

## 2021-07-01 DIAGNOSIS — I10 ESSENTIAL HYPERTENSION: Primary | ICD-10-CM

## 2021-07-01 DIAGNOSIS — K21.00 GASTROESOPHAGEAL REFLUX DISEASE WITH ESOPHAGITIS WITHOUT HEMORRHAGE: ICD-10-CM

## 2021-07-01 DIAGNOSIS — R10.13 EPIGASTRIC PAIN: ICD-10-CM

## 2021-07-01 DIAGNOSIS — E78.00 HYPERCHOLESTEREMIA: ICD-10-CM

## 2021-07-01 DIAGNOSIS — Z13.6 CARDIOVASCULAR SCREENING; LDL GOAL LESS THAN 160: ICD-10-CM

## 2021-07-01 DIAGNOSIS — Z11.59 NEED FOR HEPATITIS C SCREENING TEST: ICD-10-CM

## 2021-07-01 LAB
BASOPHILS # BLD AUTO: 0 10E9/L (ref 0–0.2)
BASOPHILS NFR BLD AUTO: 0.3 %
DIFFERENTIAL METHOD BLD: NORMAL
EOSINOPHIL # BLD AUTO: 0.1 10E9/L (ref 0–0.7)
EOSINOPHIL NFR BLD AUTO: 1.7 %
ERYTHROCYTE [DISTWIDTH] IN BLOOD BY AUTOMATED COUNT: 13.8 % (ref 10–15)
HCT VFR BLD AUTO: 41.4 % (ref 35–47)
HGB BLD-MCNC: 13.5 G/DL (ref 11.7–15.7)
LYMPHOCYTES # BLD AUTO: 3 10E9/L (ref 0.8–5.3)
LYMPHOCYTES NFR BLD AUTO: 41.5 %
MCH RBC QN AUTO: 27.6 PG (ref 26.5–33)
MCHC RBC AUTO-ENTMCNC: 32.6 G/DL (ref 31.5–36.5)
MCV RBC AUTO: 85 FL (ref 78–100)
MONOCYTES # BLD AUTO: 0.7 10E9/L (ref 0–1.3)
MONOCYTES NFR BLD AUTO: 9.9 %
NEUTROPHILS # BLD AUTO: 3.4 10E9/L (ref 1.6–8.3)
NEUTROPHILS NFR BLD AUTO: 46.6 %
PLATELET # BLD AUTO: 241 10E9/L (ref 150–450)
RBC # BLD AUTO: 4.89 10E12/L (ref 3.8–5.2)
WBC # BLD AUTO: 7.3 10E9/L (ref 4–11)

## 2021-07-01 PROCEDURE — 80050 GENERAL HEALTH PANEL: CPT | Performed by: NURSE PRACTITIONER

## 2021-07-01 PROCEDURE — 99214 OFFICE O/P EST MOD 30 MIN: CPT | Performed by: NURSE PRACTITIONER

## 2021-07-01 PROCEDURE — 36415 COLL VENOUS BLD VENIPUNCTURE: CPT | Performed by: NURSE PRACTITIONER

## 2021-07-01 PROCEDURE — 86803 HEPATITIS C AB TEST: CPT | Performed by: NURSE PRACTITIONER

## 2021-07-01 RX ORDER — AMLODIPINE BESYLATE 5 MG/1
5 TABLET ORAL DAILY
Qty: 90 TABLET | Refills: 3 | Status: SHIPPED | OUTPATIENT
Start: 2021-07-01 | End: 2022-08-04

## 2021-07-01 RX ORDER — ROSUVASTATIN CALCIUM 40 MG/1
40 TABLET, COATED ORAL DAILY
Qty: 90 TABLET | Refills: 3 | Status: SHIPPED | OUTPATIENT
Start: 2021-07-01 | End: 2022-09-15

## 2021-07-01 ASSESSMENT — MIFFLIN-ST. JEOR: SCORE: 1521.05

## 2021-07-01 NOTE — PATIENT INSTRUCTIONS
Lab in [address]    Omeprazole twice daily for a month and then daily as needed for stomach pain      refillon amlodipine for blood pressure     Refill on crestor for cholesterol     Lab in 6 months fasting

## 2021-07-01 NOTE — PROGRESS NOTES
"    Assessment & Plan     Essential hypertension  In good range   - CBC with platelets and differential  - Comprehensive metabolic panel (BMP + Alb, Alk Phos, ALT, AST, Total. Bili, TP)  - TSH with free T4 reflex  - Hepatitis C Screen Reflex to HCV RNA Quant and Genotype    CARDIOVASCULAR SCREENING; LDL GOAL LESS THAN 160  Tolerating medication   - rosuvastatin (CRESTOR) 40 MG tablet; Take 1 tablet (40 mg) by mouth daily    Need for hepatitis C screening test    - Hepatitis C Screen Reflex to HCV RNA Quant and Genotype    Epigastric pain  Discussed     Gastroesophageal reflux disease with esophagitis without hemorrhage    - omeprazole (PRILOSEC) 20 MG DR capsule; Take 1 capsule (20 mg) by mouth 2 times daily    Hypercholesteremia          17 minutes spent on the date of the encounter doing chart review, history and exam, documentation and further activities per the note       BMI:   Estimated body mass index is 31.93 kg/m  as calculated from the following:    Height as of this encounter: 1.727 m (5' 8\").    Weight as of this encounter: 95.3 kg (210 lb).       Patient Instructions   Lab in [address]    Omeprazole twice daily for a month and then daily as needed for stomach pain      refillon amlodipine for blood pressure     Refill on crestor for cholesterol     Lab in 6 months fasting       Return in about 6 months (around 1/1/2022).    HOLLY Sylvester CNP [facility]   Concepcion is a 71 year old who presents for the following health issues     HPI     Hyperlipidemia Follow-Up      Are you regularly taking any medication or supplement to lower your cholesterol?   Yes- crestor and atorvastatin    Are you having muscle aches or other side effects that you think could be caused by your cholesterol lowering medication?  Yes- no she does ok on med     Hypertension Follow-up      Do you check your blood pressure regularly outside of the clinic? Yes     Are you following a low " "salt diet? No    Are your blood pressures ever more than 140 on the top number (systolic) OR more   than 90 on the bottom number (diastolic), for example 140/90? No      How many servings of fruits and vegetables do you eat daily?  2-3    On average, how many sweetened beverages do you drink each day (Examples: soda, juice, sweet tea, etc.  Do NOT count diet or artificially sweetened beverages)?   1    How many days per week do you exercise enough to make your heart beat faster? 3 or less    How many minutes a day do you exercise enough to make your heart beat faster? 30 - 60    How many days per week do you miss taking your medication? 0    Has pain under her breasts comes and goes   No change with eating   No change with bowels moving   Nothing makes better or worse     Feet swell sometimes she wears compression socks and feet look ok       Review of Systems   Constitutional, HEENT, cardiovascular, pulmonary, GI, , musculoskeletal, neuro, skin, endocrine and psych systems are negative, except as otherwise noted.      Objective    /64   Pulse 73   Temp 97.9  F (36.6  C) (Oral)   Resp 22   Ht 1.727 m (5' 8\")   Wt 95.3 kg (210 lb)   SpO2 100%   BMI 31.93 kg/m    Body mass index is 31.93 kg/m .  Physical Exam   GENERAL:alert and no distress- here with relative   RESP: lungs clear to auscultation - no rales, rhonchi or wheezes  CV: regular rate and rhythm  ABDOMEN: soft, epigastric tender, no hepatosplenomegaly, no masses and bowel sounds normal  MS: no gross musculoskeletal defects noted, no edema  NEURO: Normal strength and tone, mentation intact and speech normal  PSYCH: mentation appears normal, affect normal/bright    Lab             "

## 2021-07-01 NOTE — LETTER
July 5, 2021      Concepcion Schumacher  [address] [place] [address]        Dear ,    We are writing to inform you of your test results.    Lab acceptable     Resulted Orders   CBC with platelets and differential   Result Value Ref Range    WBC 7.3 4.0 - 11.0 10e9/L    RBC Count 4.89 3.8 - 5.2 10e12/L    Hemoglobin 13.5 11.7 - 15.7 g/dL    Hematocrit 41.4 35.0 - 47.0 %    MCV 85 78 - 100 fl    MCH 27.6 26.5 - 33.0 pg    MCHC 32.6 31.5 - 36.5 g/dL    RDW 13.8 10.0 - 15.0 %    Platelet Count 241 150 - 450 10e9/L    % Neutrophils 46.6 %    % Lymphocytes 41.5 %    % Monocytes 9.9 %    % Eosinophils 1.7 %    % Basophils 0.3 %    Absolute Neutrophil 3.4 1.6 - 8.3 10e9/L    Absolute Lymphocytes 3.0 0.8 - 5.3 10e9/L    Absolute Monocytes 0.7 0.0 - 1.3 10e9/L    Absolute Eosinophils 0.1 0.0 - 0.7 10e9/L    Absolute Basophils 0.0 0.0 - 0.2 10e9/L    Diff Method Automated Method    Comprehensive metabolic panel (BMP + Alb, Alk Phos, ALT, AST, Total. Bili, TP)   Result Value Ref Range    Sodium 137 133 - 144 mmol/L    Potassium 4.1 3.4 - 5.3 mmol/L    Chloride 104 94 - 109 mmol/L    Carbon Dioxide 27 20 - 32 mmol/L    Anion Gap 6 3 - 14 mmol/L    Glucose 82 70 - 99 mg/dL    Urea Nitrogen 13 7 - 30 mg/dL    Creatinine 0.91 0.52 - 1.04 mg/dL    GFR Estimate 64 >60 mL/min/[1.73_m2]      Comment:      Non  GFR Calc  Starting 12/18/2018, serum creatinine based estimated GFR (eGFR) will be   calculated using the Chronic Kidney Disease Epidemiology Collaboration   (CKD-EPI) equation.      GFR Estimate If Black 74 >60 mL/min/[1.73_m2]      Comment:       GFR Calc  Starting 12/18/2018, serum creatinine based estimated GFR (eGFR) will be   calculated using the Chronic Kidney Disease Epidemiology Collaboration   (CKD-EPI) equation.      Calcium 9.7 8.5 - 10.1 mg/dL    Bilirubin Total 0.3 0.2 - 1.3 mg/dL    Albumin 3.7 3.4 - 5.0 g/dL    Protein Total 8.5 6.8 - 8.8 g/dL    Alkaline  Phosphatase 85 40 - 150 U/L    ALT 23 0 - 50 U/L    AST 28 0 - 45 U/L   TSH with free T4 reflex   Result Value Ref Range    TSH 0.76 0.40 - 4.00 mU/L       If you have any questions or concerns, please call the clinic at the number listed above.       Sincerely,      HOLLY Sylvester CNP

## 2021-07-01 NOTE — NURSING NOTE
"Chief Complaint   Patient presents with     Recheck Medication     initial /64   Pulse 73   Temp 97.9  F (36.6  C) (Oral)   Resp 22   Ht 1.727 m (5' 8\")   Wt 95.3 kg (210 lb)   SpO2 100%   BMI 31.93 kg/m   Estimated body mass index is 31.93 kg/m  as calculated from the following:    Height as of this encounter: 1.727 m (5' 8\").    Weight as of this encounter: 95.3 kg (210 lb)..  bp completed using cuff size large  HELENE GIBSON LPN  "

## 2021-07-02 LAB
ALBUMIN SERPL-MCNC: 3.7 G/DL (ref 3.4–5)
ALP SERPL-CCNC: 85 U/L (ref 40–150)
ALT SERPL W P-5'-P-CCNC: 23 U/L (ref 0–50)
ANION GAP SERPL CALCULATED.3IONS-SCNC: 6 MMOL/L (ref 3–14)
AST SERPL W P-5'-P-CCNC: 28 U/L (ref 0–45)
BILIRUB SERPL-MCNC: 0.3 MG/DL (ref 0.2–1.3)
BUN SERPL-MCNC: 13 MG/DL (ref 7–30)
CALCIUM SERPL-MCNC: 9.7 MG/DL (ref 8.5–10.1)
CHLORIDE SERPL-SCNC: 104 MMOL/L (ref 94–109)
CO2 SERPL-SCNC: 27 MMOL/L (ref 20–32)
CREAT SERPL-MCNC: 0.91 MG/DL (ref 0.52–1.04)
GFR SERPL CREATININE-BSD FRML MDRD: 64 ML/MIN/{1.73_M2}
GLUCOSE SERPL-MCNC: 82 MG/DL (ref 70–99)
HCV AB SERPL QL IA: NONREACTIVE
POTASSIUM SERPL-SCNC: 4.1 MMOL/L (ref 3.4–5.3)
PROT SERPL-MCNC: 8.5 G/DL (ref 6.8–8.8)
SODIUM SERPL-SCNC: 137 MMOL/L (ref 133–144)
TSH SERPL DL<=0.005 MIU/L-ACNC: 0.76 MU/L (ref 0.4–4)

## 2022-01-31 RX ORDER — AMLODIPINE BESYLATE 5 MG/1
5 TABLET ORAL DAILY
Qty: 90 TABLET | Refills: 0 | OUTPATIENT
Start: 2022-01-31

## 2022-01-31 NOTE — TELEPHONE ENCOUNTER
Medication refused, filled on 7/1/21 for a 90 day supply with 3 RF.     Refused Prescriptions:                       Disp   Refills    amLODIPine (NORVASC) 5 MG tablet [Pharmacy*90 tab*0        Sig: Take 1 tablet (5 mg) by mouth daily    Old prescription

## 2022-07-29 ENCOUNTER — OFFICE VISIT (OUTPATIENT)
Dept: INTERNAL MEDICINE | Facility: CLINIC | Age: 72
End: 2022-07-29
Payer: COMMERCIAL

## 2022-07-29 VITALS
RESPIRATION RATE: 20 BRPM | WEIGHT: 207 LBS | HEART RATE: 65 BPM | HEIGHT: 68 IN | TEMPERATURE: 98.1 F | OXYGEN SATURATION: 99 % | BODY MASS INDEX: 31.37 KG/M2 | SYSTOLIC BLOOD PRESSURE: 112 MMHG | DIASTOLIC BLOOD PRESSURE: 60 MMHG

## 2022-07-29 DIAGNOSIS — R42 EPISODE OF DIZZINESS: Primary | ICD-10-CM

## 2022-07-29 DIAGNOSIS — M54.50 LEFT-SIDED LOW BACK PAIN WITHOUT SCIATICA, UNSPECIFIED CHRONICITY: ICD-10-CM

## 2022-07-29 DIAGNOSIS — M62.830 PARASPINAL MUSCLE SPASM: ICD-10-CM

## 2022-07-29 DIAGNOSIS — Z12.31 VISIT FOR SCREENING MAMMOGRAM: ICD-10-CM

## 2022-07-29 PROCEDURE — 99213 OFFICE O/P EST LOW 20 MIN: CPT | Performed by: NURSE PRACTITIONER

## 2022-07-29 RX ORDER — NAPROXEN 500 MG/1
500 TABLET ORAL 2 TIMES DAILY WITH MEALS
Qty: 28 TABLET | Refills: 0 | Status: SHIPPED | OUTPATIENT
Start: 2022-07-29 | End: 2024-04-16

## 2022-07-29 RX ORDER — CYCLOBENZAPRINE HCL 10 MG
10 TABLET ORAL 3 TIMES DAILY PRN
Qty: 30 TABLET | Refills: 0 | Status: SHIPPED | OUTPATIENT
Start: 2022-07-29 | End: 2022-09-15

## 2022-07-29 NOTE — PROGRESS NOTES
"  Assessment & Plan     Episode of dizziness  Discussed holter   - Adult Leadless EKG Monitor 3 to 7 Days; Future    Left-sided low back pain without sciatica, unspecified chronicity  Discussed medication   - naproxen (NAPROSYN) 500 MG tablet; Take 1 tablet (500 mg) by mouth 2 times daily (with meals)  - cyclobenzaprine (FLEXERIL) 10 MG tablet; Take 1 tablet (10 mg) by mouth 3 times daily as needed for muscle spasms    Paraspinal muscle spasm    - naproxen (NAPROSYN) 500 MG tablet; Take 1 tablet (500 mg) by mouth 2 times daily (with meals)  - cyclobenzaprine (FLEXERIL) 10 MG tablet; Take 1 tablet (10 mg) by mouth 3 times daily as needed for muscle spasms    Visit for screening mammogram    - MA SCREENING DIGITAL BILAT - Future  (s+30); Future      20 minutes spent on the date of the encounter doing chart review, history and exam, documentation and further activities per the note       BMI:   Estimated body mass index is 31.47 kg/m  as calculated from the following:    Height as of this encounter: 1.727 m (5' 8\").    Weight as of this encounter: 93.9 kg (207 lb).       Patient Instructions   Naprosyn twice daily with food     Flexeril at bedtime for muscle relaxer     Heat to back     May use biofreeze or icy hot to back     Holter monitor - they will call you to set up       Return in about 1 month (around 8/31/2022) for Routine preventive.    HOLLY Sylvester CNP  [facility]    Caden Javier is a 72 year old, presenting for the following health issues:    Chief Complaint   Patient presents with     Back Pain     Pt has dizziness and back pain     History of Present Illness       Reason for visit:  Back painShe exercises with enough effort to increase her heart rate 9 or less minutes per day.  She exercises with enough effort to increase her heart rate 3 or less days per week.   She is taking medications regularly.       Left side back pain   2 weeks pain   She does gardening - " "no know injury      Dizziness episode - does not know if related to heart   No know precipitator   In past she felt heart racing with dizziness       Review of Systems   Constitutional, HEENT, cardiovascular, pulmonary, GI, , musculoskeletal, neuro, skin, endocrine and psych systems are negative, except as otherwise noted.      Objective    /60   Pulse 65   Temp 98.1  F (36.7  C) (Oral)   Resp 20   Ht 1.727 m (5' 8\")   Wt 93.9 kg (207 lb)   SpO2 99%   BMI 31.47 kg/m    Body mass index is 31.47 kg/m .  Physical Exam   GENERAL: alert and no distress- son    HENT: ear canals and TM's normal, nose and mouth without ulcers or lesions  RESP: lungs clear to auscultation - no rales, rhonchi or wheezes  CV: regular rate and rhythm  MS: no gross musculoskeletal defects noted, no edema  MS: pain in left paraspinal muscle lumbar spine and to hip area   Negative straight leg raise   NEURO: Normal strength and tone, mentation intact and speech normal  PSYCH: mentation appears normal, affect normal/bright                    .  ..  "

## 2022-07-29 NOTE — PATIENT INSTRUCTIONS
Naprosyn twice daily with food     Flexeril at bedtime for muscle relaxer     Heat to back     May use biofreeze or icy hot to back     Holter monitor - they will call you to set up

## 2022-07-29 NOTE — NURSING NOTE
"Chief Complaint   Patient presents with     Back Pain     Pt has dizziness and back pain     initial /60   Pulse 65   Temp 98.1  F (36.7  C) (Oral)   Resp 20   Ht 1.727 m (5' 8\")   Wt 93.9 kg (207 lb)   SpO2 99%   BMI 31.47 kg/m   Estimated body mass index is 31.47 kg/m  as calculated from the following:    Height as of this encounter: 1.727 m (5' 8\").    Weight as of this encounter: 93.9 kg (207 lb)..  bp completed using cuff size large  HELENE GIBSON LPN  "

## 2022-08-02 DIAGNOSIS — I10 ESSENTIAL HYPERTENSION: Primary | ICD-10-CM

## 2022-08-03 ENCOUNTER — HOSPITAL ENCOUNTER (OUTPATIENT)
Dept: CARDIOLOGY | Facility: CLINIC | Age: 72
Discharge: HOME | End: 2022-08-03
Attending: NURSE PRACTITIONER | Admitting: NURSE PRACTITIONER
Payer: COMMERCIAL

## 2022-08-03 DIAGNOSIS — R42 EPISODE OF DIZZINESS: ICD-10-CM

## 2022-08-03 PROCEDURE — 93242 EXT ECG>48HR<7D RECORDING: CPT

## 2022-08-03 PROCEDURE — 93244 EXT ECG>48HR<7D REV&INTERPJ: CPT | Performed by: INTERNAL MEDICINE

## 2022-08-03 NOTE — LETTER
[place] [address], [address]  [place] [address]  [phone]        August 18, 2022    Concepcion Schumacher  [address] [address]            Dear MsAntwan Schumacher:      The results of your recent ekg showed no concerning findings.  If you have any further questions or problems, please contact our office.    Sincerely,        MANDY Gomez

## 2022-08-04 RX ORDER — AMLODIPINE BESYLATE 5 MG/1
TABLET ORAL
Qty: 90 TABLET | Refills: 3 | Status: SHIPPED | OUTPATIENT
Start: 2022-08-04 | End: 2022-08-22

## 2022-08-04 NOTE — TELEPHONE ENCOUNTER
Routing refill request to provider for review/approval because:  Creatinine   Date Value Ref Range Status   07/01/2021 0.91 0.52 - 1.04 mg/dL Final      Micheline Segovia, RN

## 2022-08-19 DIAGNOSIS — I10 ESSENTIAL HYPERTENSION: ICD-10-CM

## 2022-08-22 RX ORDER — AMLODIPINE BESYLATE 5 MG/1
TABLET ORAL
Qty: 90 TABLET | Refills: 0 | Status: SHIPPED | OUTPATIENT
Start: 2022-08-22 | End: 2022-09-15

## 2022-08-23 NOTE — TELEPHONE ENCOUNTER
Patient requesting refill from 8/4/22 be sent to another pharmacy     Prescription approved per [facility] Refill Protocol.

## 2022-09-15 ENCOUNTER — OFFICE VISIT (OUTPATIENT)
Dept: INTERNAL MEDICINE | Facility: CLINIC | Age: 72
End: 2022-09-15
Payer: COMMERCIAL

## 2022-09-15 VITALS
SYSTOLIC BLOOD PRESSURE: 133 MMHG | HEART RATE: 73 BPM | OXYGEN SATURATION: 96 % | HEIGHT: 68 IN | DIASTOLIC BLOOD PRESSURE: 67 MMHG | TEMPERATURE: 97.5 F | RESPIRATION RATE: 16 BRPM | WEIGHT: 205 LBS | BODY MASS INDEX: 31.07 KG/M2

## 2022-09-15 DIAGNOSIS — Z78.0 POST-MENOPAUSAL: ICD-10-CM

## 2022-09-15 DIAGNOSIS — M62.830 PARASPINAL MUSCLE SPASM: ICD-10-CM

## 2022-09-15 DIAGNOSIS — Z12.31 ENCOUNTER FOR SCREENING MAMMOGRAM FOR BREAST CANCER: ICD-10-CM

## 2022-09-15 DIAGNOSIS — Z00.00 ROUTINE GENERAL MEDICAL EXAMINATION AT A HEALTH CARE FACILITY: Primary | ICD-10-CM

## 2022-09-15 DIAGNOSIS — M54.50 LEFT-SIDED LOW BACK PAIN WITHOUT SCIATICA, UNSPECIFIED CHRONICITY: ICD-10-CM

## 2022-09-15 DIAGNOSIS — I10 ESSENTIAL HYPERTENSION: ICD-10-CM

## 2022-09-15 DIAGNOSIS — H53.9 VISION CHANGES: ICD-10-CM

## 2022-09-15 DIAGNOSIS — K21.00 GASTROESOPHAGEAL REFLUX DISEASE WITH ESOPHAGITIS WITHOUT HEMORRHAGE: ICD-10-CM

## 2022-09-15 DIAGNOSIS — Z13.6 CARDIOVASCULAR SCREENING; LDL GOAL LESS THAN 160: ICD-10-CM

## 2022-09-15 LAB
BASOPHILS # BLD AUTO: 0 10E3/UL (ref 0–0.2)
BASOPHILS NFR BLD AUTO: 1 %
EOSINOPHIL # BLD AUTO: 0.1 10E3/UL (ref 0–0.7)
EOSINOPHIL NFR BLD AUTO: 2 %
ERYTHROCYTE [DISTWIDTH] IN BLOOD BY AUTOMATED COUNT: 13.2 % (ref 10–15)
HCT VFR BLD AUTO: 43.2 % (ref 35–47)
HGB BLD-MCNC: 14.2 G/DL (ref 11.7–15.7)
IMM GRANULOCYTES # BLD: 0 10E3/UL
IMM GRANULOCYTES NFR BLD: 0 %
LYMPHOCYTES # BLD AUTO: 3 10E3/UL (ref 0.8–5.3)
LYMPHOCYTES NFR BLD AUTO: 47 %
MCH RBC QN AUTO: 27.5 PG (ref 26.5–33)
MCHC RBC AUTO-ENTMCNC: 32.9 G/DL (ref 31.5–36.5)
MCV RBC AUTO: 84 FL (ref 78–100)
MONOCYTES # BLD AUTO: 0.6 10E3/UL (ref 0–1.3)
MONOCYTES NFR BLD AUTO: 9 %
NEUTROPHILS # BLD AUTO: 2.6 10E3/UL (ref 1.6–8.3)
NEUTROPHILS NFR BLD AUTO: 41 %
PLATELET # BLD AUTO: 257 10E3/UL (ref 150–450)
RBC # BLD AUTO: 5.16 10E6/UL (ref 3.8–5.2)
WBC # BLD AUTO: 6.3 10E3/UL (ref 4–11)

## 2022-09-15 PROCEDURE — 80050 GENERAL HEALTH PANEL: CPT | Performed by: NURSE PRACTITIONER

## 2022-09-15 PROCEDURE — 99397 PER PM REEVAL EST PAT 65+ YR: CPT | Performed by: NURSE PRACTITIONER

## 2022-09-15 PROCEDURE — 99214 OFFICE O/P EST MOD 30 MIN: CPT | Mod: 25 | Performed by: NURSE PRACTITIONER

## 2022-09-15 PROCEDURE — 80061 LIPID PANEL: CPT | Performed by: NURSE PRACTITIONER

## 2022-09-15 PROCEDURE — 36415 COLL VENOUS BLD VENIPUNCTURE: CPT | Performed by: NURSE PRACTITIONER

## 2022-09-15 RX ORDER — ROSUVASTATIN CALCIUM 40 MG/1
40 TABLET, COATED ORAL DAILY
Qty: 90 TABLET | Refills: 3 | Status: SHIPPED | OUTPATIENT
Start: 2022-09-15 | End: 2024-04-16

## 2022-09-15 RX ORDER — CYCLOBENZAPRINE HCL 10 MG
10 TABLET ORAL 3 TIMES DAILY PRN
Qty: 30 TABLET | Refills: 0 | Status: SHIPPED | OUTPATIENT
Start: 2022-09-15 | End: 2024-04-16

## 2022-09-15 RX ORDER — AMLODIPINE BESYLATE 5 MG/1
5 TABLET ORAL DAILY
Qty: 90 TABLET | Refills: 3 | Status: SHIPPED | OUTPATIENT
Start: 2022-09-15 | End: 2022-11-23

## 2022-09-15 ASSESSMENT — PATIENT HEALTH QUESTIONNAIRE - PHQ9
SUM OF ALL RESPONSES TO PHQ QUESTIONS 1-9: 0
10. IF YOU CHECKED OFF ANY PROBLEMS, HOW DIFFICULT HAVE THESE PROBLEMS MADE IT FOR YOU TO DO YOUR WORK, TAKE CARE OF THINGS AT HOME, OR GET ALONG WITH OTHER PEOPLE: NOT DIFFICULT AT ALL
SUM OF ALL RESPONSES TO PHQ QUESTIONS 1-9: 0

## 2022-09-15 ASSESSMENT — ANXIETY QUESTIONNAIRES
GAD7 TOTAL SCORE: 0
8. IF YOU CHECKED OFF ANY PROBLEMS, HOW DIFFICULT HAVE THESE MADE IT FOR YOU TO DO YOUR WORK, TAKE CARE OF THINGS AT HOME, OR GET ALONG WITH OTHER PEOPLE?: NOT DIFFICULT AT ALL
5. BEING SO RESTLESS THAT IT IS HARD TO SIT STILL: NOT AT ALL
1. FEELING NERVOUS, ANXIOUS, OR ON EDGE: NOT AT ALL
7. FEELING AFRAID AS IF SOMETHING AWFUL MIGHT HAPPEN: NOT AT ALL
IF YOU CHECKED OFF ANY PROBLEMS ON THIS QUESTIONNAIRE, HOW DIFFICULT HAVE THESE PROBLEMS MADE IT FOR YOU TO DO YOUR WORK, TAKE CARE OF THINGS AT HOME, OR GET ALONG WITH OTHER PEOPLE: NOT DIFFICULT AT ALL
GAD7 TOTAL SCORE: 0
4. TROUBLE RELAXING: NOT AT ALL
7. FEELING AFRAID AS IF SOMETHING AWFUL MIGHT HAPPEN: NOT AT ALL
6. BECOMING EASILY ANNOYED OR IRRITABLE: NOT AT ALL
3. WORRYING TOO MUCH ABOUT DIFFERENT THINGS: NOT AT ALL
GAD7 TOTAL SCORE: 0
2. NOT BEING ABLE TO STOP OR CONTROL WORRYING: NOT AT ALL

## 2022-09-15 ASSESSMENT — ACTIVITIES OF DAILY LIVING (ADL): CURRENT_FUNCTION: NO ASSISTANCE NEEDED

## 2022-09-15 NOTE — PATIENT INSTRUCTIONS
LAB IN [address]    MEDICATION REFILLED     To schedule your mammogram, you may call the breast center at [phone].     BONE DENSITY SCAN

## 2022-09-15 NOTE — LETTER
[place] [address], [address]  [place] [address]  [phone]        September 26, 2022    Concepcion Schumacher  [address]   [place] [address]            Dear Ms. Concepcion HARTMAN Endy:    I've been trying to call you.    The results of your recent Lipid profile were abnormal.  Have you been taking your medications?    If you have any further questions or problems, please contact our office.    Sincerely,        Diana Phillips N.P.NENA    Results for orders placed or performed in visit on 09/15/22   Lipid panel reflex to direct LDL Fasting     Status: Abnormal   Result Value Ref Range    Cholesterol 229 (H) <200 mg/dL    Triglycerides 202 (H) <150 mg/dL    Direct Measure HDL 42 (L) >=50 mg/dL    LDL Cholesterol Calculated 147 (H) <=100 mg/dL    Non HDL Cholesterol 187 (H) <130 mg/dL    Patient Fasting > 8hrs? No     Narrative    Cholesterol  Desirable:  <200 mg/dL    Triglycerides  Normal:  Less than 150 mg/dL  Borderline High:  150-199 mg/dL  High:  200-499 mg/dL  Very High:  Greater than or equal to 500 mg/dL    Direct Measure HDL  Female:  Greater than or equal to 50 mg/dL   Male:  Greater than or equal to 40 mg/dL    LDL Cholesterol  Desirable:  <100mg/dL  Above Desirable:  100-129 mg/dL   Borderline High:  130-159 mg/dL   High:  160-189 mg/dL   Very High:  >= 190 mg/dL    Non HDL Cholesterol  Desirable:  130 mg/dL  Above Desirable:  130-159 mg/dL  Borderline High:  160-189 mg/dL  High:  190-219 mg/dL  Very High:  Greater than or equal to 220 mg/dL   Comprehensive metabolic panel (BMP + Alb, Alk Phos, ALT, AST, Total. Bili, TP)     Status: Abnormal   Result Value Ref Range    Sodium 137 133 - 144 mmol/L    Potassium 4.2 3.4 - 5.3 mmol/L    Chloride 105 94 - 109 mmol/L    Carbon Dioxide (CO2) 27 20 - 32 mmol/L    Anion Gap 5 3 - 14 mmol/L    Urea Nitrogen 15 7 - 30 mg/dL    Creatinine 0.82 0.52 - 1.04 mg/dL    Calcium 9.6 8.5 - 10.1 mg/dL    Glucose 106 (H) 70 - 99  mg/dL    Alkaline Phosphatase 82 40 - 150 U/L    AST 23 0 - 45 U/L    ALT 20 0 - 50 U/L    Protein Total 8.5 6.8 - 8.8 g/dL    Albumin 3.5 3.4 - 5.0 g/dL    Bilirubin Total 0.4 0.2 - 1.3 mg/dL    GFR Estimate 76 >60 mL/min/1.73m2   TSH with free T4 reflex     Status: Normal   Result Value Ref Range    TSH 0.42 0.40 - 4.00 mU/L   CBC with platelets and differential     Status: None   Result Value Ref Range    WBC Count 6.3 4.0 - 11.0 10e3/uL    RBC Count 5.16 3.80 - 5.20 10e6/uL    Hemoglobin 14.2 11.7 - 15.7 g/dL    Hematocrit 43.2 35.0 - 47.0 %    MCV 84 78 - 100 fL    MCH 27.5 26.5 - 33.0 pg    MCHC 32.9 31.5 - 36.5 g/dL    RDW 13.2 10.0 - 15.0 %    Platelet Count 257 150 - 450 10e3/uL    % Neutrophils 41 %    % Lymphocytes 47 %    % Monocytes 9 %    % Eosinophils 2 %    % Basophils 1 %    % Immature Granulocytes 0 %    Absolute Neutrophils 2.6 1.6 - 8.3 10e3/uL    Absolute Lymphocytes 3.0 0.8 - 5.3 10e3/uL    Absolute Monocytes 0.6 0.0 - 1.3 10e3/uL    Absolute Eosinophils 0.1 0.0 - 0.7 10e3/uL    Absolute Basophils 0.0 0.0 - 0.2 10e3/uL    Absolute Immature Granulocytes 0.0 <=0.4 10e3/uL   CBC with platelets and differential     Status: None    Narrative    The following orders were created for panel order CBC with platelets and differential.  Procedure                               Abnormality         Status                     ---------                               -----------         ------                     CBC with platelets and d...[[id number]]                      Final result                 Please view results for these tests on the individual orders.

## 2022-09-15 NOTE — PROGRESS NOTES
"SUBJECTIVE:   Concepcion is a 72 year old who presents for Preventive Visit.    Fasting. Son in room. Both decline interpretor.    Patient has been advised of split billing requirements and indicates understanding: Yes  Are you in the first 12 months of your Medicare coverage?  No    Healthy Habits:     In general, how would you rate your overall health?  Excellent    Frequency of exercise:  4-5 days/week    Duration of exercise:  45-60 minutes    Do you usually eat at least 4 servings of fruit and vegetables a day, include whole grains    & fiber and avoid regularly eating high fat or \"junk\" foods?  Yes    Taking medications regularly:  Yes    Medication side effects:  None    Ability to successfully perform activities of daily living:  No assistance needed    Home Safety:  No safety concerns identified    Hearing Impairment:  No hearing concerns    In the past 6 months, have you been bothered by leaking of urine?  No    In general, how would you rate your overall mental or emotional health?  Excellent      PHQ-2 Total Score: 0    Additional concerns today:  No    Do you feel safe in your environment? Yes    Have you ever done Advance Care Planning? (For example, a Health Directive, POLST, or a discussion with a medical provider or your loved ones about your wishes): No, advance care planning information given to patient to review.  Patient declined advance care planning discussion at this time.       Fall risk  Fallen 2 or more times in the past year?: No  Any fall with injury in the past year?: No    Cognitive Screening   1) Repeat 3 items (Leader, Season, Table)    2) Clock draw: ABNORMAL hands & numbers  3) 3 item recall: Recalls 3 objects  Results: 0 items recalled: PROBABLE COGNITIVE IMPAIRMENT, **INFORM PROVIDER**    Mini-CogTM Copyright SARA Paul. Licensed by the author for use in [facility]; reprinted with permission (israel@.[facility]). All rights reserved.      Do you have sleep apnea, excessive snoring " or daytime drowsiness?: no    Reviewed and updated as needed this visit by clinical staff   Tobacco  Allergies  Meds  Problems  Med Hx  Surg Hx  Fam Hx  Soc   Hx          Reviewed and updated as needed this visit by Provider    Allergies                Social History     Tobacco Use     Smoking status: Never Smoker     Smokeless tobacco: Never Used   Substance Use Topics     Alcohol use: No     If you drink alcohol do you typically have >3 drinks per day or >7 drinks per week? No    Alcohol Use 9/15/2022   Prescreen: >3 drinks/day or >7 drinks/week? No   Prescreen: >3 drinks/day or >7 drinks/week? -         Current providers sharing in care for this patient include:   Patient Care Team:  Diana Phillips APRN CNP as PCP - General (Nurse Practitioner - Family)  Diana Phillips APRN CNP as Assigned PCP    The following health maintenance items are reviewed in Epic and correct as of today:  Health Maintenance   Topic Date Due     DEXA  Never done     DTAP/TDAP/TD IMMUNIZATION (1 - Tdap) Never done     ZOSTER IMMUNIZATION (1 of 2) Never done     Pneumococcal Vaccine: 65+ Years (1 - PCV) Never done     MAMMO SCREENING  05/29/2021     COVID-19 Vaccine (3 - Booster for Pfizer series) 10/31/2021     INFLUENZA VACCINE (1) 06/30/2023 (Originally 9/1/2022)     ANNUAL REVIEW OF HM ORDERS  07/29/2023     MEDICARE ANNUAL WELLNESS VISIT  09/15/2023     FALL RISK ASSESSMENT  09/15/2023     COLORECTAL CANCER SCREENING  09/20/2023     LIPID  03/10/2025     ADVANCE CARE PLANNING  09/15/2027     HEPATITIS C SCREENING  Completed     PHQ-2 (once per calendar year)  Completed     IPV IMMUNIZATION  Aged Out     MENINGITIS IMMUNIZATION  Aged Out     HEPATITIS B IMMUNIZATION  Aged Out               Review of Systems  Constitutional, HEENT, cardiovascular, pulmonary, GI, , musculoskeletal, neuro, skin, endocrine and psych systems are negative, except as otherwise noted.    OBJECTIVE:   /67   Pulse 73   Temp 97.5  F  "(36.4  C) (Oral)   Resp 16   Ht 1.727 m (5' 8\")   Wt 93 kg (205 lb)   SpO2 96%   BMI 31.17 kg/m   Estimated body mass index is 31.17 kg/m  as calculated from the following:    Height as of this encounter: 1.727 m (5' 8\").    Weight as of this encounter: 93 kg (205 lb).  Physical Exam  GENERAL: alert and no distress  RESP: lungs clear to auscultation - no rales, rhonchi or wheezes  CV: regular rate and rhythm  ABDOMEN: soft, nontender, no hepatosplenomegaly, no masses and bowel sounds normal  MS: no gross musculoskeletal defects noted, no edema  NEURO: Normal strength and tone, mentation intact and speech normal  PSYCH: mentation appears normal, affect normal/bright    Diagnostic Test Results:  Labs reviewed in [facility]  LAB     ASSESSMENT / PLAN:   (Z00.00) Routine general medical examination at a health care facility  (primary encounter diagnosis)  Comment:   Plan:     (I10) Essential hypertension  Comment: in good range   Plan: amLODIPine (NORVASC) 5 MG tablet, Lipid panel         reflex to direct LDL Fasting, Comprehensive         metabolic panel (BMP + Alb, Alk Phos, ALT, AST,        Total. Bili, TP), TSH with free T4 reflex, CBC         with platelets and differential            (M62.830) Paraspinal muscle spasm  Comment: uses as needed  Plan: cyclobenzaprine (FLEXERIL) 10 MG tablet,         Comprehensive metabolic panel (BMP + Alb, Alk         Phos, ALT, AST, Total. Bili, TP), TSH with free        T4 reflex            (M54.50) Left-sided low back pain without sciatica, unspecified chronicity  Comment: needs sparingly   Plan: cyclobenzaprine (FLEXERIL) 10 MG tablet,         Comprehensive metabolic panel (BMP + Alb, Alk         Phos, ALT, AST, Total. Bili, TP), TSH with free        T4 reflex            (Z13.6) CARDIOVASCULAR SCREENING; LDL GOAL LESS THAN 160  Comment: tolerating medication   Plan: rosuvastatin (CRESTOR) 40 MG tablet, Lipid         panel reflex to direct LDL Fasting,         Comprehensive " "metabolic panel (BMP + Alb, Alk         Phos, ALT, AST, Total. Bili, TP)            (K21.00) Gastroesophageal reflux disease with esophagitis without hemorrhage  Comment: medication is helpful  Plan: omeprazole (PRILOSEC) 20 MG DR capsule,         Comprehensive metabolic panel (BMP + Alb, Alk         Phos, ALT, AST, Total. Bili, TP)            (H53.9) Vision changes  Comment: needs eye exam  Plan: Adult Eye  Referral            (Z78.0) Post-menopausal  Comment:   Plan: DEXA HIP/PELVIS/SPINE - Future            (Z12.31) Encounter for screening mammogram for breast cancer  Comment:   Plan: *MA Screening Digital Bilateral                  COUNSELING:  Reviewed preventive health counseling, as reflected in patient instructions       Regular exercise       Healthy diet/nutrition       Immunizations    Declined: Influenza, Pneumococcal, TDAP and Zoster due to Concerns about side effects/safety               Osteoporosis prevention/bone health    Estimated body mass index is 31.17 kg/m  as calculated from the following:    Height as of this encounter: 1.727 m (5' 8\").    Weight as of this encounter: 93 kg (205 lb).    Weight management plan: Discussed healthy diet and exercise guidelines    She reports that she has never smoked. She has never used smokeless tobacco.      Appropriate preventive services were discussed with this patient, including applicable screening as appropriate for cardiovascular disease, diabetes, osteopenia/osteoporosis, and glaucoma.  As appropriate for age/gender, discussed screening for colorectal cancer, prostate cancer, breast cancer, and cervical cancer. Checklist reviewing preventive services available has been given to the patient.    Reviewed patients plan of care and provided an AVS. The Basic Care Plan (routine screening as documented in Health Maintenance) for Concepcion meets the Care Plan requirement. This Care Plan has been established and reviewed with the Patient.    Counseling " Resources:  ATP IV Guidelines  Pooled Cohorts Equation Calculator  Breast Cancer Risk Calculator  Breast Cancer: Medication to Reduce Risk  FRAX Risk Assessment  ICSI Preventive Guidelines  Dietary Guidelines for Americans, 2010  USDA's MyPlate  ASA Prophylaxis  Lung CA Screening    HOLLY Sylvester CNP  [facility]    Identified Health Risks:  Answers for HPI/ROS submitted by the patient on 9/15/2022  If you checked off any problems, how difficult have these problems made it for you to do your work, take care of things at home, or get along with other people?: Not difficult at all  PHQ9 TOTAL SCORE: 0  KEVON 7 TOTAL SCORE: 0

## 2022-09-16 LAB
ALBUMIN SERPL-MCNC: 3.5 G/DL (ref 3.4–5)
ALP SERPL-CCNC: 82 U/L (ref 40–150)
ALT SERPL W P-5'-P-CCNC: 20 U/L (ref 0–50)
ANION GAP SERPL CALCULATED.3IONS-SCNC: 5 MMOL/L (ref 3–14)
AST SERPL W P-5'-P-CCNC: 23 U/L (ref 0–45)
BILIRUB SERPL-MCNC: 0.4 MG/DL (ref 0.2–1.3)
BUN SERPL-MCNC: 15 MG/DL (ref 7–30)
CALCIUM SERPL-MCNC: 9.6 MG/DL (ref 8.5–10.1)
CHLORIDE BLD-SCNC: 105 MMOL/L (ref 94–109)
CHOLEST SERPL-MCNC: 229 MG/DL
CO2 SERPL-SCNC: 27 MMOL/L (ref 20–32)
CREAT SERPL-MCNC: 0.82 MG/DL (ref 0.52–1.04)
FASTING STATUS PATIENT QL REPORTED: NO
GFR SERPL CREATININE-BSD FRML MDRD: 76 ML/MIN/1.73M2
GLUCOSE BLD-MCNC: 106 MG/DL (ref 70–99)
HDLC SERPL-MCNC: 42 MG/DL
LDLC SERPL CALC-MCNC: 147 MG/DL
NONHDLC SERPL-MCNC: 187 MG/DL
POTASSIUM BLD-SCNC: 4.2 MMOL/L (ref 3.4–5.3)
PROT SERPL-MCNC: 8.5 G/DL (ref 6.8–8.8)
SODIUM SERPL-SCNC: 137 MMOL/L (ref 133–144)
TRIGL SERPL-MCNC: 202 MG/DL
TSH SERPL DL<=0.005 MIU/L-ACNC: 0.42 MU/L (ref 0.4–4)

## 2022-11-03 ENCOUNTER — HOSPITAL ENCOUNTER (OUTPATIENT)
Dept: MAMMOGRAPHY | Facility: CLINIC | Age: 72
Discharge: HOME | End: 2022-11-03
Attending: NURSE PRACTITIONER | Admitting: NURSE PRACTITIONER
Payer: COMMERCIAL

## 2022-11-03 DIAGNOSIS — Z12.31 ENCOUNTER FOR SCREENING MAMMOGRAM FOR BREAST CANCER: ICD-10-CM

## 2022-11-03 PROCEDURE — 77067 SCR MAMMO BI INCL CAD: CPT

## 2022-11-23 DIAGNOSIS — I10 ESSENTIAL HYPERTENSION: ICD-10-CM

## 2022-11-23 RX ORDER — AMLODIPINE BESYLATE 5 MG/1
5 TABLET ORAL DAILY
Qty: 90 TABLET | Refills: 3 | Status: SHIPPED | OUTPATIENT
Start: 2022-11-23 | End: 2024-04-16

## 2022-11-23 NOTE — TELEPHONE ENCOUNTER
Patient needs another refill sent to the pharmacy. They called pharmacy and they said they didn't have it but I am seeing that there are three orders in. Patient is out of medication and needs it ASAP. The preferred pharmacy is the [facility] in [place].    Majo Mccullough

## 2023-01-19 NOTE — PATIENT INSTRUCTIONS
Lab in [address]    Consider tetanus, pneumonia and shingles immunizations    May try TUMS if needed for stomach discomfort    no

## 2023-07-05 NOTE — ADDENDUM NOTE
Addended by: LIVAN MCCLOUD on: 12/22/2017 09:48 AM     Modules accepted: Orders     

## 2023-07-08 NOTE — PATIENT INSTRUCTIONS
Lab in [address]    Chest x ray in [address]    Flexeril at bedtime for left arm pain and shoulder pain     omnicef twice daily for 10 days     Prednisone  Take in morning with food   Take 3 tabs daily for 3 days  Take 2 tabs daily for 3 days   Take 1 tab daily for 3 days   Then stop     Follow up with any questions or concerns.     
No

## 2023-11-30 ENCOUNTER — TRANSFERRED RECORDS (OUTPATIENT)
Dept: HEALTH INFORMATION MANAGEMENT | Facility: CLINIC | Age: 73
End: 2023-11-30

## 2023-11-30 ENCOUNTER — HOSPITAL ENCOUNTER (EMERGENCY)
Facility: CLINIC | Age: 73
Discharge: HOME | End: 2023-11-30
Attending: EMERGENCY MEDICINE | Admitting: EMERGENCY MEDICINE
Payer: COMMERCIAL

## 2023-11-30 ENCOUNTER — APPOINTMENT (OUTPATIENT)
Dept: CT IMAGING | Facility: CLINIC | Age: 73
End: 2023-11-30
Attending: EMERGENCY MEDICINE
Payer: COMMERCIAL

## 2023-11-30 ENCOUNTER — APPOINTMENT (OUTPATIENT)
Dept: ULTRASOUND IMAGING | Facility: CLINIC | Age: 73
End: 2023-11-30
Attending: EMERGENCY MEDICINE
Payer: COMMERCIAL

## 2023-11-30 VITALS
OXYGEN SATURATION: 100 % | BODY MASS INDEX: 30.47 KG/M2 | HEART RATE: 61 BPM | SYSTOLIC BLOOD PRESSURE: 170 MMHG | RESPIRATION RATE: 18 BRPM | DIASTOLIC BLOOD PRESSURE: 73 MMHG | TEMPERATURE: 97.9 F | HEIGHT: 69 IN | WEIGHT: 205.69 LBS

## 2023-11-30 DIAGNOSIS — M79.604 RIGHT LEG PAIN: ICD-10-CM

## 2023-11-30 DIAGNOSIS — R07.9 CHEST PAIN, UNSPECIFIED TYPE: ICD-10-CM

## 2023-11-30 DIAGNOSIS — I83.811 VARICOSE VEINS OF RIGHT LOWER EXTREMITY WITH PAIN: ICD-10-CM

## 2023-11-30 LAB
ANION GAP SERPL CALCULATED.3IONS-SCNC: 9 MMOL/L (ref 7–15)
ATRIAL RATE - MUSE: 57 BPM
BASOPHILS # BLD AUTO: 0 10E3/UL (ref 0–0.2)
BASOPHILS NFR BLD AUTO: 1 %
BUN SERPL-MCNC: 13.2 MG/DL (ref 8–23)
CALCIUM SERPL-MCNC: 8.9 MG/DL (ref 8.8–10.2)
CHLORIDE SERPL-SCNC: 101 MMOL/L (ref 98–107)
CREAT SERPL-MCNC: 0.83 MG/DL (ref 0.51–0.95)
DEPRECATED HCO3 PLAS-SCNC: 28 MMOL/L (ref 22–29)
DIASTOLIC BLOOD PRESSURE - MUSE: NORMAL MMHG
EGFRCR SERPLBLD CKD-EPI 2021: 74 ML/MIN/1.73M2
EOSINOPHIL # BLD AUTO: 0.1 10E3/UL (ref 0–0.7)
EOSINOPHIL NFR BLD AUTO: 1 %
ERYTHROCYTE [DISTWIDTH] IN BLOOD BY AUTOMATED COUNT: 13.9 % (ref 10–15)
GLUCOSE SERPL-MCNC: 83 MG/DL (ref 70–99)
HCT VFR BLD AUTO: 43.1 % (ref 35–47)
HGB BLD-MCNC: 14 G/DL (ref 11.7–15.7)
HOLD SPECIMEN: NORMAL
HOLD SPECIMEN: NORMAL
IMM GRANULOCYTES # BLD: 0 10E3/UL
IMM GRANULOCYTES NFR BLD: 0 %
INTERPRETATION ECG - MUSE: NORMAL
LYMPHOCYTES # BLD AUTO: 3 10E3/UL (ref 0.8–5.3)
LYMPHOCYTES NFR BLD AUTO: 44 %
MCH RBC QN AUTO: 27.7 PG (ref 26.5–33)
MCHC RBC AUTO-ENTMCNC: 32.5 G/DL (ref 31.5–36.5)
MCV RBC AUTO: 85 FL (ref 78–100)
MONOCYTES # BLD AUTO: 0.6 10E3/UL (ref 0–1.3)
MONOCYTES NFR BLD AUTO: 8 %
NEUTROPHILS # BLD AUTO: 3.1 10E3/UL (ref 1.6–8.3)
NEUTROPHILS NFR BLD AUTO: 46 %
NRBC # BLD AUTO: 0 10E3/UL
NRBC BLD AUTO-RTO: 0 /100
P AXIS - MUSE: 60 DEGREES
PLATELET # BLD AUTO: 263 10E3/UL (ref 150–450)
POTASSIUM SERPL-SCNC: 4.2 MMOL/L (ref 3.4–5.3)
PR INTERVAL - MUSE: 176 MS
QRS DURATION - MUSE: 86 MS
QT - MUSE: 454 MS
QTC - MUSE: 441 MS
R AXIS - MUSE: 46 DEGREES
RBC # BLD AUTO: 5.06 10E6/UL (ref 3.8–5.2)
SODIUM SERPL-SCNC: 138 MMOL/L (ref 135–145)
SYSTOLIC BLOOD PRESSURE - MUSE: NORMAL MMHG
T AXIS - MUSE: 44 DEGREES
TROPONIN T SERPL HS-MCNC: <6 NG/L
VENTRICULAR RATE- MUSE: 57 BPM
WBC # BLD AUTO: 6.8 10E3/UL (ref 4–11)

## 2023-11-30 PROCEDURE — 80048 BASIC METABOLIC PNL TOTAL CA: CPT | Performed by: EMERGENCY MEDICINE

## 2023-11-30 PROCEDURE — 85018 HEMOGLOBIN: CPT | Performed by: EMERGENCY MEDICINE

## 2023-11-30 PROCEDURE — 99285 EMERGENCY DEPT VISIT HI MDM: CPT | Mod: 25

## 2023-11-30 PROCEDURE — 250N000011 HC RX IP 250 OP 636: Performed by: EMERGENCY MEDICINE

## 2023-11-30 PROCEDURE — 85041 AUTOMATED RBC COUNT: CPT | Performed by: EMERGENCY MEDICINE

## 2023-11-30 PROCEDURE — 84484 ASSAY OF TROPONIN QUANT: CPT | Performed by: EMERGENCY MEDICINE

## 2023-11-30 PROCEDURE — 93005 ELECTROCARDIOGRAM TRACING: CPT

## 2023-11-30 PROCEDURE — 36415 COLL VENOUS BLD VENIPUNCTURE: CPT | Performed by: EMERGENCY MEDICINE

## 2023-11-30 PROCEDURE — 71275 CT ANGIOGRAPHY CHEST: CPT

## 2023-11-30 PROCEDURE — 93970 EXTREMITY STUDY: CPT

## 2023-11-30 PROCEDURE — 250N000009 HC RX 250: Performed by: EMERGENCY MEDICINE

## 2023-11-30 RX ORDER — IOPAMIDOL 755 MG/ML
500 INJECTION, SOLUTION INTRAVASCULAR ONCE
Status: COMPLETED | OUTPATIENT
Start: 2023-11-30 | End: 2023-11-30

## 2023-11-30 RX ADMIN — SODIUM CHLORIDE 97 ML: 9 INJECTION, SOLUTION INTRAVENOUS at 14:09

## 2023-11-30 RX ADMIN — IOPAMIDOL 80 ML: 755 INJECTION, SOLUTION INTRAVENOUS at 14:09

## 2023-11-30 ASSESSMENT — ACTIVITIES OF DAILY LIVING (ADL)
ADLS_ACUITY_SCORE: 33
ADLS_ACUITY_SCORE: 35

## 2023-11-30 NOTE — ED PROVIDER NOTES
"    History     Chief Complaint:  Chest Pain and Leg Pain       HPI   Concepcion Schumacher is a 73 year old female who presents to the ED as referral from an outpatient clinic.  She is here with her daughter.  The patient has been in her normal state of health until 1 week ago.  She began to have some pain in the right lateral lower leg.  No trauma or falls.  No fever.  No skin rash.  They do note some dark spots.  It is unclear whether she has been diagnosed with varicose veins in the past.  Yesterday she began to have some pain in the right upper chest.  It is tender to pressure and worse with movement as well as somewhat pleuritic.  No history of DVT or PE.  Fever, cough, congestion.  No history of coronary artery disease.  Her chest pain is not exertional.  No dyspnea.  No rash over the chest wall.      Independent Historian:    The patient's daughter is present and provides additional history.    Review of External Notes:  Outpatient note reviewed from earlier today when the patient was seen and referred into the ED for consideration of PE.    Medications:    amLODIPine (NORVASC) 5 MG tablet  Calcium-Magnesium-Vitamin D (CALCIUM 500 PO)  cyclobenzaprine (FLEXERIL) 10 MG tablet  naproxen (NAPROSYN) 500 MG tablet  omeprazole (PRILOSEC) 20 MG DR capsule  rosuvastatin (CRESTOR) 40 MG tablet        Past Medical History:    Past Medical History:   Diagnosis Date    Hyperparathyroidism (H)     Mantoux: positive, treated     Thyroid disease        Past Surgical History:    Past Surgical History:   Procedure Laterality Date    PARATHYROIDECTOMY  7/3/2012    Procedure: PARATHYROIDECTOMY;  Neck exploration with Excision parathyroid adenoma, with rapid PTH assay, preoperative sestimeby injection;  Surgeon: Jaclyn Sanchez MD;  Location:  OR          Physical Exam   Patient Vitals for the past 24 hrs:   BP Temp Temp src Pulse Resp SpO2 Height Weight   11/30/23 1142 (!) 171/78 97.9  F (36.6  C) Oral 64 18 98 % 1.753 m (5' 9\") " 93.3 kg (205 lb 11 oz)        Physical Exam  Constitutional:       General: She is not in acute distress.     Appearance: Normal appearance. She is not diaphoretic.   HENT:      Head: Atraumatic.      Right Ear: External ear normal.      Left Ear: External ear normal.      Mouth/Throat:      Mouth: Mucous membranes are moist.   Eyes:      General: No scleral icterus.     Conjunctiva/sclera: Conjunctivae normal.   Cardiovascular:      Rate and Rhythm: Normal rate and regular rhythm.      Heart sounds: Normal heart sounds.      Comments: No rash of the chest.  Mild tenderness to the right upper chest.  Pulmonary:      Effort: No respiratory distress.      Breath sounds: Normal breath sounds.   Abdominal:      General: Abdomen is flat. There is no distension.      Tenderness: There is no abdominal tenderness.   Musculoskeletal:      Cervical back: Neck supple.      Right lower leg: No edema.      Left lower leg: No edema.      Comments: Scattered areas of skin discoloration on the right lower extremity.  There are varicose veins that are mildly tender.  No bleeding.  No calf swelling on the right compared to left.   Skin:     General: Skin is warm.      Capillary Refill: Capillary refill takes less than 2 seconds.      Findings: No rash.   Neurological:      General: No focal deficit present.      Mental Status: She is alert and oriented to person, place, and time.   Psychiatric:         Mood and Affect: Mood normal.         Behavior: Behavior normal.           Emergency Department Course   ECG  ECG results from 11/30/23   EKG 12-lead, tracing only     Value    Systolic Blood Pressure     Diastolic Blood Pressure     Ventricular Rate 57    Atrial Rate 57    HI Interval 176    QRS Duration 86        QTc 441    P Axis 60    R AXIS 46    T Axis 44    Interpretation ECG      Sinus bradycardia  Nonspecific T wave abnormality  Abnormal ECG  When compared with ECG of 05-AUG-2020 12:10,  No significant change was  found  Unconfirmed report - interpretation of this ECG is computer generated - see medical record for final interpretation  Confirmed by - EMERGENCY ROOM, PHYSICIAN ([id number]),  JAQUI ARCHULETA ([id number]) on 11/30/2023 2:30:03 PM          Imaging:  US Lower Extremity Venous Duplex Bilateral   Final Result   IMPRESSION:   1.  No deep venous thrombosis in the bilateral lower extremities.      JARED RITTER MD            SYSTEM ID:  [id number]      CT Chest Pulmonary Embolism w Contrast   Preliminary Result   IMPRESSION:   No pulmonary embolus. No acute findings in the chest.        Report per radiology    Laboratory:  Labs Ordered and Resulted from Time of ED Arrival to Time of ED Departure   BASIC METABOLIC PANEL - Normal       Result Value    Sodium 138      Potassium 4.2      Chloride 101      Carbon Dioxide (CO2) 28      Anion Gap 9      Urea Nitrogen 13.2      Creatinine 0.83      GFR Estimate 74      Calcium 8.9      Glucose 83     TROPONIN T, HIGH SENSITIVITY - Normal    Troponin T, High Sensitivity <6     CBC WITH PLATELETS AND DIFFERENTIAL    WBC Count 6.8      RBC Count 5.06      Hemoglobin 14.0      Hematocrit 43.1      MCV 85      MCH 27.7      MCHC 32.5      RDW 13.9      Platelet Count 263      % Neutrophils 46      % Lymphocytes 44      % Monocytes 8      % Eosinophils 1      % Basophils 1      % Immature Granulocytes 0      NRBCs per 100 WBC 0      Absolute Neutrophils 3.1      Absolute Lymphocytes 3.0      Absolute Monocytes 0.6      Absolute Eosinophils 0.1      Absolute Basophils 0.0      Absolute Immature Granulocytes 0.0      Absolute NRBCs 0.0        Emergency Department Course & Assessments:             Interventions:  Medications   iopamidol (ISOVUE-370) solution 500 mL (80 mLs Intravenous $Given 11/30/23 1409)   CT Scan Flush (97 mLs Intravenous $Given 11/30/23 1409)       Disposition:  The patient was discharged to home.     Impression & Plan      Medical Decision Making:  This patient is a  73-year-old who presents to the emergency department for evaluation of right leg pain.  This is been present for a week and likely is due to varicose veins.  DVT study is negative.  She began to have chest discomfort yesterday which ultimately led her to be referred into the ED.  EKG does not show ischemic changes and troponin is negative.  CT scan without PE, pneumothorax, infiltrate, pleural effusion, or other acute pathology.  Her daughter says that the pain seem to be provoked by breathing cold air yesterday so this could be a component of bronchospasm.  Given her age and risk factors though in the absence of any prior provocative cardiac workup she will be referred to cardiology for consideration of further workup.        Diagnosis:    ICD-10-CM    1. Chest pain, unspecified type  R07.9 Follow-Up with Cardiology      2. Right leg pain  M79.604       3. Varicose veins of right lower extremity with pain  I83.811              11/30/2023   Bayron Luong MD McRoberts, Sean Edward, MD  11/30/23 1543

## 2023-11-30 NOTE — ED TRIAGE NOTES
Pt presents for evaluation of right sided chest pain since last night around 2100. Pain radiates to midsternal and through to the back. Described as a sharp, constant pain. Rated 7/10. Denies any associated dizziness, shortness of breath or diaphoresis. Also c/o pain in RLE. Has a few small darkened areas to the RLE. Pain for the last week. Pt went to [facility], had an EKG and was sent here for further evaluation.

## 2024-04-16 ENCOUNTER — OFFICE VISIT (OUTPATIENT)
Dept: INTERNAL MEDICINE | Facility: CLINIC | Age: 74
End: 2024-04-16
Payer: COMMERCIAL

## 2024-04-16 VITALS
SYSTOLIC BLOOD PRESSURE: 152 MMHG | RESPIRATION RATE: 18 BRPM | BODY MASS INDEX: 31.99 KG/M2 | HEART RATE: 83 BPM | OXYGEN SATURATION: 99 % | WEIGHT: 216 LBS | DIASTOLIC BLOOD PRESSURE: 76 MMHG | TEMPERATURE: 97.8 F | HEIGHT: 69 IN

## 2024-04-16 DIAGNOSIS — K21.00 GASTROESOPHAGEAL REFLUX DISEASE WITH ESOPHAGITIS WITHOUT HEMORRHAGE: ICD-10-CM

## 2024-04-16 DIAGNOSIS — M23.51 RECURRENT RIGHT KNEE INSTABILITY: ICD-10-CM

## 2024-04-16 DIAGNOSIS — Z86.0100 HISTORY OF COLONIC POLYPS: ICD-10-CM

## 2024-04-16 DIAGNOSIS — M25.561 RIGHT KNEE PAIN, UNSPECIFIED CHRONICITY: ICD-10-CM

## 2024-04-16 DIAGNOSIS — Z00.00 ROUTINE GENERAL MEDICAL EXAMINATION AT A HEALTH CARE FACILITY: Primary | ICD-10-CM

## 2024-04-16 DIAGNOSIS — Z12.11 SCREEN FOR COLON CANCER: ICD-10-CM

## 2024-04-16 DIAGNOSIS — Z12.31 VISIT FOR SCREENING MAMMOGRAM: ICD-10-CM

## 2024-04-16 DIAGNOSIS — R10.11 RUQ ABDOMINAL PAIN: ICD-10-CM

## 2024-04-16 DIAGNOSIS — E55.9 VITAMIN D DEFICIENCY: ICD-10-CM

## 2024-04-16 DIAGNOSIS — I10 ESSENTIAL HYPERTENSION: ICD-10-CM

## 2024-04-16 LAB
BASOPHILS # BLD AUTO: 0 10E3/UL (ref 0–0.2)
BASOPHILS NFR BLD AUTO: 1 %
EOSINOPHIL # BLD AUTO: 0.1 10E3/UL (ref 0–0.7)
EOSINOPHIL NFR BLD AUTO: 2 %
ERYTHROCYTE [DISTWIDTH] IN BLOOD BY AUTOMATED COUNT: 13.7 % (ref 10–15)
HCT VFR BLD AUTO: 43.6 % (ref 35–47)
HGB BLD-MCNC: 14.1 G/DL (ref 11.7–15.7)
IMM GRANULOCYTES # BLD: 0 10E3/UL
IMM GRANULOCYTES NFR BLD: 0 %
LYMPHOCYTES # BLD AUTO: 3.4 10E3/UL (ref 0.8–5.3)
LYMPHOCYTES NFR BLD AUTO: 47 %
MCH RBC QN AUTO: 27.8 PG (ref 26.5–33)
MCHC RBC AUTO-ENTMCNC: 32.3 G/DL (ref 31.5–36.5)
MCV RBC AUTO: 86 FL (ref 78–100)
MONOCYTES # BLD AUTO: 0.6 10E3/UL (ref 0–1.3)
MONOCYTES NFR BLD AUTO: 8 %
NEUTROPHILS # BLD AUTO: 3.1 10E3/UL (ref 1.6–8.3)
NEUTROPHILS NFR BLD AUTO: 43 %
PLATELET # BLD AUTO: 283 10E3/UL (ref 150–450)
RBC # BLD AUTO: 5.08 10E6/UL (ref 3.8–5.2)
WBC # BLD AUTO: 7.3 10E3/UL (ref 4–11)

## 2024-04-16 PROCEDURE — 36415 COLL VENOUS BLD VENIPUNCTURE: CPT | Performed by: NURSE PRACTITIONER

## 2024-04-16 PROCEDURE — 99214 OFFICE O/P EST MOD 30 MIN: CPT | Mod: 25 | Performed by: NURSE PRACTITIONER

## 2024-04-16 PROCEDURE — 82306 VITAMIN D 25 HYDROXY: CPT | Performed by: NURSE PRACTITIONER

## 2024-04-16 PROCEDURE — 80053 COMPREHEN METABOLIC PANEL: CPT | Performed by: NURSE PRACTITIONER

## 2024-04-16 PROCEDURE — 85025 COMPLETE CBC W/AUTO DIFF WBC: CPT | Performed by: NURSE PRACTITIONER

## 2024-04-16 PROCEDURE — 84443 ASSAY THYROID STIM HORMONE: CPT | Performed by: NURSE PRACTITIONER

## 2024-04-16 PROCEDURE — 99397 PER PM REEVAL EST PAT 65+ YR: CPT | Performed by: NURSE PRACTITIONER

## 2024-04-16 PROCEDURE — 80061 LIPID PANEL: CPT | Performed by: NURSE PRACTITIONER

## 2024-04-16 RX ORDER — AMLODIPINE BESYLATE 5 MG/1
5 TABLET ORAL 2 TIMES DAILY
Qty: 180 TABLET | Refills: 3 | Status: SHIPPED | OUTPATIENT
Start: 2024-04-16

## 2024-04-16 SDOH — HEALTH STABILITY: PHYSICAL HEALTH: ON AVERAGE, HOW MANY DAYS PER WEEK DO YOU ENGAGE IN MODERATE TO STRENUOUS EXERCISE (LIKE A BRISK WALK)?: 0 DAYS

## 2024-04-16 ASSESSMENT — PATIENT HEALTH QUESTIONNAIRE - PHQ9
SUM OF ALL RESPONSES TO PHQ QUESTIONS 1-9: 0
SUM OF ALL RESPONSES TO PHQ QUESTIONS 1-9: 0
10. IF YOU CHECKED OFF ANY PROBLEMS, HOW DIFFICULT HAVE THESE PROBLEMS MADE IT FOR YOU TO DO YOUR WORK, TAKE CARE OF THINGS AT HOME, OR GET ALONG WITH OTHER PEOPLE: NOT DIFFICULT AT ALL

## 2024-04-16 ASSESSMENT — ANXIETY QUESTIONNAIRES
GAD7 TOTAL SCORE: 0
7. FEELING AFRAID AS IF SOMETHING AWFUL MIGHT HAPPEN: NOT AT ALL
6. BECOMING EASILY ANNOYED OR IRRITABLE: NOT AT ALL
GAD7 TOTAL SCORE: 0
5. BEING SO RESTLESS THAT IT IS HARD TO SIT STILL: NOT AT ALL
3. WORRYING TOO MUCH ABOUT DIFFERENT THINGS: NOT AT ALL
8. IF YOU CHECKED OFF ANY PROBLEMS, HOW DIFFICULT HAVE THESE MADE IT FOR YOU TO DO YOUR WORK, TAKE CARE OF THINGS AT HOME, OR GET ALONG WITH OTHER PEOPLE?: NOT DIFFICULT AT ALL
2. NOT BEING ABLE TO STOP OR CONTROL WORRYING: NOT AT ALL
4. TROUBLE RELAXING: NOT AT ALL
7. FEELING AFRAID AS IF SOMETHING AWFUL MIGHT HAPPEN: NOT AT ALL
1. FEELING NERVOUS, ANXIOUS, OR ON EDGE: NOT AT ALL
IF YOU CHECKED OFF ANY PROBLEMS ON THIS QUESTIONNAIRE, HOW DIFFICULT HAVE THESE PROBLEMS MADE IT FOR YOU TO DO YOUR WORK, TAKE CARE OF THINGS AT HOME, OR GET ALONG WITH OTHER PEOPLE: NOT DIFFICULT AT ALL
GAD7 TOTAL SCORE: 0

## 2024-04-16 ASSESSMENT — SOCIAL DETERMINANTS OF HEALTH (SDOH): HOW OFTEN DO YOU GET TOGETHER WITH FRIENDS OR RELATIVES?: ONCE A WEEK

## 2024-04-16 NOTE — PROGRESS NOTES
Preventive Care Visit  [facility]  HOLLY Sylvester CNP, Internal Medicine  Apr 16, 2024      Assessment & Plan     Routine general medical examination at a health care facility  She is here with her son who helps provide information  - Lipid panel reflex to direct LDL Non-fasting; Future  - Comprehensive metabolic panel (BMP + Alb, Alk Phos, ALT, AST, Total. Bili, TP); Future  - TSH with free T4 reflex; Future  - CBC with platelets and differential; Future    Screen for colon cancer  She had an adenomatous polyp back in 2013 and has not had a follow-up.  She is willing to do  - Colonoscopy Screening  Referral; Future    Visit for screening mammogram    - MA SCREENING DIGITAL BILAT - Future  (s+30); Future    Essential hypertension  Needs improvement.  Will change to amlodipine 5 mg twice a day  - Comprehensive metabolic panel (BMP + Alb, Alk Phos, ALT, AST, Total. Bili, TP); Future  - TSH with free T4 reflex; Future  - CBC with platelets and differential; Future  - amLODIPine (NORVASC) 5 MG tablet; Take 1 tablet (5 mg) by mouth 2 times daily    Recurrent right knee instability  Feels her knees gives out on her and causes pain  - Orthopedic  Referral; Future    Right knee pain, unspecified chronicity    - Orthopedic  Referral; Future    Vitamin D deficiency    - Vitamin D Deficiency; Future    History of colonic polyps    - Colonoscopy Screening  Referral; Future    RUQ abdominal pain  She was taking omeprazole ongoing but has stopped.  We will start that back up.  We will check an ultrasound as she has some significant pain and positive Steinberg sign -she did have an ultrasound in 2021 that was negative for anything in the gallbladder  - US Abdomen Complete; Future    Gastroesophageal reflux disease with esophagitis without hemorrhage  Discussed  - omeprazole (PRILOSEC) 20 MG DR capsule; Take 1 capsule (20 mg) by mouth 2 times daily        26  "minutes spent by me on the date of the encounter doing chart review, history and exam, documentation and further activities per the note      BMI  Estimated body mass index is 31.9 kg/m  as calculated from the following:    Height as of this encounter: 1.753 m (5' 9\").    Weight as of this encounter: 98 kg (216 lb).       Counseling  Appropriate preventive services were discussed with this patient, including applicable screening as appropriate for fall prevention, nutrition, physical activity, Tobacco-use cessation, weight loss and cognition.  Checklist reviewing preventive services available has been given to the patient.  Reviewed patient's diet, addressing concerns and/or questions.       Patient Instructions   Lab in [address]    To schedule your mammogram, you may call the breast center at [phone].     Colonoscopy - they will call you to set up     Amlodipine 5 mg twice daily for blood pressure      We will more than likely restart a cholesterol medication after lab are back     Ultrasound abdomen- they will call you to set up      Restart omeprazole twice daily for stomach pain     Caden Javier is a 74 year old, presenting for the following:  Medicare Visit        4/16/2024     1:39 PM   Additional Questions   Roomed by Naya MARS       Health Care Directive  Patient does not have a Health Care Directive or Living Will: Discussed advance care planning with patient; however, patient declined at this time.    HPI    Lab     Colonoscopy 2013  Adenoma polyp   Will do colonoscopy     Knees give out at times   Right side   Right knee pain     Blood pressure not at goal  Increase amlodipine            4/16/2024   General Health   How would you rate your overall physical health? (!) FAIR   Feel stress (tense, anxious, or unable to sleep) Not at all         4/16/2024   Nutrition   Diet: Regular (no restrictions)         4/16/2024   Exercise   Days per week of moderate/strenous exercise 0 days         " 4/16/2024   Social Factors   Frequency of gathering with friends or relatives Once a week         9/15/2022   Activities of Daily Living- Home Safety   Needs help with the following daily activites NO assistance is needed   Safety concerns in the home None of the above         4/16/2024   Dental   Dentist two times every year? (!) DECLINE         9/15/2022   Hearing Screening   Hearing concerns? No concerns            No data to display                   Today's PHQ-9 Score:       4/16/2024     1:41 PM   PHQ-9 SCORE   PHQ-9 Total Score MyChart 0   PHQ-9 Total Score 0         4/16/2024   Substance Use   Alcohol more than 3/day or more than 7/wk Not Applicable   Do you have a current opioid prescription? No   How severe/bad is pain from 1 to 10? 4/10   Do you use any other substances recreationally? No     Social History     Tobacco Use    Smoking status: Never    Smokeless tobacco: Never   Vaping Use    Vaping status: Never Used   Substance Use Topics    Alcohol use: No    Drug use: No           11/3/2022   LAST FHS-7 RESULTS   1st degree relative breast or ovarian cancer No   Any relative bilateral breast cancer No   Any male have breast cancer No   Any ONE woman have BOTH breast AND ovarian cancer No   Any woman with breast cancer before 50yrs No   2 or more relatives with breast AND/OR ovarian cancer No   2 or more relatives with breast AND/OR bowel cancer No            ASCVD Risk   The 10-year ASCVD risk score (Gulshan RODRIGUEZ, et al., 2019) is: 19%    Values used to calculate the score:      Age: 74 years      Sex: Female      Is Non- : Yes      Diabetic: No      Tobacco smoker: No      Systolic Blood Pressure: 152 mmHg      Is BP treated: Yes      HDL Cholesterol: 42 mg/dL      Total Cholesterol: 229 mg/dL            Reviewed and updated as needed this visit by Provider                    Lab work is in process  Current providers sharing in care for this patient include:  Patient Care  "Team:  Diana Phillips APRN CNP as PCP - General (Nurse Practitioner - Family)  Diana Phillips APRN CNP as Assigned PCP    The following health maintenance items are reviewed in Epic and correct as of today:  Health Maintenance   Topic Date Due    DEXA  Never done    DTAP/TDAP/TD IMMUNIZATION (1 - Tdap) Never done    ZOSTER IMMUNIZATION (1 of 2) Never done    RSV VACCINE (Pregnancy & 60+) (1 - 1-dose 60+ series) Never done    Pneumococcal Vaccine: 65+ Years (1 of 1 - PCV) Never done    INFLUENZA VACCINE (1) 09/01/2023    COVID-19 Vaccine (3 - 2023-24 season) 09/01/2023    LIPID  09/15/2023    COLORECTAL CANCER SCREENING  09/20/2023    MAMMO SCREENING  11/03/2023    MEDICARE ANNUAL WELLNESS VISIT  04/16/2025    ANNUAL REVIEW OF HM ORDERS  04/16/2025    FALL RISK ASSESSMENT  04/16/2025    GLUCOSE  11/30/2026    ADVANCE CARE PLANNING  09/15/2027    HEPATITIS C SCREENING  Completed    PHQ-2 (once per calendar year)  Completed    IPV IMMUNIZATION  Aged Out    HPV IMMUNIZATION  Aged Out    MENINGITIS IMMUNIZATION  Aged Out    RSV MONOCLONAL ANTIBODY  Aged Out         Review of Systems  Constitutional, neuro, ENT, endocrine, pulmonary, cardiac, gastrointestinal, genitourinary, musculoskeletal, integument and psychiatric systems are negative, except as otherwise noted.     Objective    Exam  BP (!) 152/76   Pulse 83   Temp 97.8  F (36.6  C) (Oral)   Resp 18   Ht 1.753 m (5' 9\")   Wt 98 kg (216 lb)   SpO2 99%   BMI 31.90 kg/m     Estimated body mass index is 31.9 kg/m  as calculated from the following:    Height as of this encounter: 1.753 m (5' 9\").    Weight as of this encounter: 98 kg (216 lb).    Physical Exam  GENERAL: alert and no distress-here with son who provides some interpretation for her  EYES: Eyes grossly normal to inspection, and conjunctivae and sclerae normal  HENT: ear canals and TM's normal, nose and mouth without ulcers or lesions  NECK: no adenopathy, no asymmetry, masses, or " scars  RESP: lungs clear to auscultation - no rales, rhonchi or wheezes  CV: regular rate and rhythm, normal S1 S2, no S3 or S4, no murmur, click or rub, no peripheral edema  ABDOMEN: soft, tender right upper quadrant with a positive Steinberg sign, no hepatosplenomegaly, no masses and bowel sounds normal  MS: no gross musculoskeletal defects noted, no edema  SKIN: no suspicious lesions or rashes  NEURO: Normal strength and tone, mentation intact and speech normal  PSYCH: mentation appears normal, affect normal/bright         4/16/2024   Mini Cog   Clock Draw Score 0 Abnormal   3 Item Recall 0 objects recalled   Mini Cog Total Score 0             Signed Electronically by: HOLLY Sylvester CNP    Answers submitted by the patient for this visit:  Patient Health Questionnaire (Submitted on 4/16/2024)  If you checked off any problems, how difficult have these problems made it for you to do your work, take care of things at home, or get along with other people?: Not difficult at all  PHQ9 TOTAL SCORE: 0  KEVON-7 (Submitted on 4/16/2024)  KEVON 7 TOTAL SCORE: 0

## 2024-04-16 NOTE — PROGRESS NOTES
Preventive Care Visit  [facility]  Diana Phillips, APRN CNP, Internal Medicine  Apr 16, 2024  {Provider  Link to SmartSet :940960}    {PROVIDER CHARTING PREFERENCE:192394}    Caden Javier is a 74 year old, presenting for the following:  Medicare Visit        4/16/2024     1:39 PM   Additional Questions   Roomed by Naya MARS        Health Care Directive  Patient does not have a Health Care Directive or Living Will: {ADVANCE_DIRECTIVE_STATUS:579134}    HPI  ***  {MA/LPN/RN Pre-Provider Visit Orders- hCG/UA/Strep (Optional):699915}  {SUPERLIST (Optional):184458}  {additonal problems for provider to add (Optional):404165}      9/15/2022   General Health   How would you rate your overall physical health? Excellent         9/15/2022   Nutrition   At least 4 servings of fruits and vegetables/day Yes         9/15/2022   Exercise   Frequency of exercise: 4-5 days/week            9/15/2022   Activities of Daily Living- Home Safety   Needs help with the following daily activites NO assistance is needed   Safety concerns in the home None of the above          No data to display                  9/15/2022   Hearing Screening   Hearing concerns? No concerns            No data to display                   {Rooming Staff Patient needs a PHQ as part of the AWV.  Use this link to complete and then refresh the note to pull results Link to PHQ2 Assessment :669696}  {USE TO PULL IN PHQ RESULTS FOR TODAY:220790}      9/15/2022   Substance Use   Alcohol more than 3/day or more than 7/wk No     Social History     Tobacco Use    Smoking status: Never    Smokeless tobacco: Never   Vaping Use    Vaping status: Never Used   Substance Use Topics    Alcohol use: No    Drug use: No     {Provider  If there are gaps in the social history shown above, please follow the link to update and then refresh the note Link to Social and Substance History :310684}      11/3/2022   LAST FHS-7 RESULTS   1st degree relative  breast or ovarian cancer No   Any relative bilateral breast cancer No   Any male have breast cancer No   Any ONE woman have BOTH breast AND ovarian cancer No   Any woman with breast cancer before 50yrs No   2 or more relatives with breast AND/OR ovarian cancer No   2 or more relatives with breast AND/OR bowel cancer No     {If any of the questions to the FHS7 are answered yes, consider referral for genetic counseling.    Additional indications for genetic referral include personal history of breast or ovarian cancer, genetic mutation in 1st degree relative which increases risk of breast cancer including BRCA1, BRCA2, LAKSHMI, PALB 2, TP53, CHEK2, PTEN, CDH1, STK11 (per ACS) and/or 1st degree relative with history of pancreatic or high-risk prostate cancer (per NCCN):817260}   {Mammogram Decision Support (Optional):345053}    ASCVD Risk   The 10-year ASCVD risk score (Gulshan RODRIGUEZ, et al., 2019) is: 22.5%    Values used to calculate the score:      Age: 74 years      Sex: Female      Is Non- : Yes      Diabetic: No      Tobacco smoker: No      Systolic Blood Pressure: 170 mmHg      Is BP treated: Yes      HDL Cholesterol: 42 mg/dL      Total Cholesterol: 229 mg/dL    {Link to Fracture Risk Assessment Tool (Optional):405438}    {Provider  Use the storyboard to review patient history, after sections have been marked as reviewed, refresh note to capture documentation:460701}  {Provider   REQUIRED AWV use this link to review and update sexual activity history  after section has been marked as reviewed, refresh note to capture documentation:694334}  Reviewed and updated as needed this visit by Provider                    {HISTORY OPTIONS (Optional):622451}  Current providers sharing in care for this patient include:  Patient Care Team:  Diana Phillips APRN CNP as PCP - General (Nurse Practitioner - Family)  Diana Phillips APRN CNP as Assigned PCP    The following [facility]  "items are reviewed in Epic and correct as of today:  Health Maintenance   Topic Date Due    DEXA  Never done    DTAP/TDAP/TD IMMUNIZATION (1 - Tdap) Never done    ZOSTER IMMUNIZATION (1 of 2) Never done    RSV VACCINE (Pregnancy & 60+) (1 - 1-dose 60+ series) Never done    Pneumococcal Vaccine: 65+ Years (1 of 1 - PCV) Never done    ANNUAL REVIEW OF HM ORDERS  07/29/2023    INFLUENZA VACCINE (1) 09/01/2023    COVID-19 Vaccine (3 - 2023-24 season) 09/01/2023    MEDICARE ANNUAL WELLNESS VISIT  09/15/2023    LIPID  09/15/2023    FALL RISK ASSESSMENT  09/15/2023    COLORECTAL CANCER SCREENING  09/20/2023    MAMMO SCREENING  11/03/2023    PHQ-2 (once per calendar year)  01/01/2024    GLUCOSE  11/30/2026    ADVANCE CARE PLANNING  09/15/2027    HEPATITIS C SCREENING  Completed    IPV IMMUNIZATION  Aged Out    HPV IMMUNIZATION  Aged Out    MENINGITIS IMMUNIZATION  Aged Out    RSV MONOCLONAL ANTIBODY  Aged Out       {ROS Picklists (Optional):434510}     Objective    Exam  There were no vitals taken for this visit.   Estimated body mass index is 30.38 kg/m  as calculated from the following:    Height as of 11/30/23: 1.753 m (5' 9\").    Weight as of 11/30/23: 93.3 kg (205 lb 11 oz).    Physical Exam  {Exam Choices (Optional):848878}  {Provider  The Mini-Cog is incomplete, use link to complete and refresh note Link to Mini-Cog :300340}       No data to display              {A Mini-Cog total score of 0-2 suggests the possibility of dementia, score of 3-5 suggests no dementia:917732}        Signed Electronically by: HOLLY Sylvester CNP  {Email feedback regarding this note to [email or web address]   :819257}  "

## 2024-04-16 NOTE — COMMUNITY RESOURCES LIST (PATIENT PREFERRED LANGUAGE)
April 16, 2024           ORODHA KATHYCHIARA ILIYO BINAFSISHA YA HUDUMA na PROGRAM           na YANNANI    ya Mtu binafsi/Rau      Health system - Summer Sports Camp  [address] [place] [address] (Umbali: 2.1 maili)  Simu: [phone]  Tovuti: [email or web address]_[email or web address]  Lugha: Lilia  Ada: Ceci      [place] - Sports clubs and recreational activities - [facility]  [address] [place] [address] (Umbali: 2.1 maili)  Umaira: Lilia  Ada: Kujilipa, Mizani ya [facility] - Adult Enrichment  Simu: [phone]  Tovuti: [email or web address]  Umaira: Lilia  Vivek: Jum 7:30 AM - 4:00 PM Jum 7:30 AM - 4:00 PM Jum 7:30 AM - 4:00 PM Alh 7:30 AM - 4:00 PM Iju 7:30 AM - 4:00 PM    jasmine Ybarra      Health system - Group Exercise Classes  [address] [place] [address] (Umbali: 2.1 maili)  Simu: [phone]  Tovuti: [email or web address]/[facility]/[place]_[facility]/health__fitness/free_group_exercise_classes  Poonamgha: Lilia  Ada: Ceci      Center - Yoga Therapy  [address] [place] [address] (Umbali: 1.0 maili)  Simu: [phone]  Umaira: Lilia Kim: Ceci  Ufikivu: Judy hernández      [facility] - Adult Enrichment  Simu: [phone]  Tovuti: [email or web address]  Orion: Lilia  Vivek: Jum 7:30 AM - 4:00 PM Jum 7:30 AM - 4:00 PM Jum 7:30 AM - 4:00 PM Alh 7:30 AM - 4:00 PM Iju 7:30 AM - 4:00 PM               SUSY calderón [facility]  91  .   Chirag rolon Umoja  211 [email or web address]  .   Henna kaur sumbryan  [phone] http://mnpoison.org http://[email or web address]  .     Chirag an Mgogoro  988 http://988[facility] Centerline.org  .   Sarah kaur  [place]  [phone] http://Childhelphotline.org   .   Nambari ya Arsh ya Kitaifa ya Unyanyasaji wa Ngono  [phone] (MATUMAINI) http://Rainn.org   .     Usalama wa Benignoaifa wa Opaldionicio  [phone] (RUNAWAY) http://[address] [place], [place] [address]BioCurity.[email or web address]  .   Msaada wa Mimba na Baada ya Kuzaa  Piga/juan carlos ujumbe kwa [phone] [place]: http://ppsupportmn.org [place]: http://Caring.com.com/wi  .   Nambari ya Rosendoada ya Benignoaifa ya Matumizi Elmer rolon Fredis ([facility])  800-622-HELP ([phone]) http://Findtreatment.gov   .                ISIAH: Oseas maciasetosheila salasitia [facility] . Unite Us haiidhinishi watoa huduma wowote waliotajwa katika orodha hii ya rasilimali. Unite Us haihakikishii kuwa huduma zilizotajwa katika orodha hii ya oseas pritchard kwako au zitaboresha afya au afya yako.    [facility]

## 2024-04-16 NOTE — COMMUNITY RESOURCES LIST (ENGLISH)
April 16, 2024           YOUR PERSONALIZED LIST OF SERVICES & PROGRAMS           & RECREATION    Sports      [facility] - Summer Sports Camp  [address] [place] [address] (Distance: 2.1 miles)  Phone: [phone]  Website: [email or web address]_programs/[email or web address]/summer_youth_sports  Language: English  Fee: Self pay      [place] - Sports clubs and recreational activities - [facility]  [address] [place] [address] (Distance: 2.1 miles)  Language: English  Fee: Self pay, Sliding scale      [facility] - Adult Enrichment  Phone: [phone]  Website: [email or web address]  Language: English  Hours: Mon 7:30 AM - 4:00 PM Tue 7:30 AM - 4:00 PM Wed 7:30 AM - 4:00 PM Thu 7:30 AM - 4:00 PM Fri 7:30 AM - 4:00 PM    Classes/Groups      [facility] - Group Exercise Classes  [address] [place] [address] (Distance: 2.1 miles)  Phone: [phone]  Website: [email or web address]://www.[employer]/locations/[facility]/health__fitness/free_group_exercise_classes  Language: English  Fee: Self pay      Center - Yoga Therapy  [address] [place] [address] (Distance: 1.0 miles)  Phone: [phone]  Language: English  Fee: Self pay  Accessibility: Ada accessible      [facility] - Adult Enrichment  Phone: [phone]  Website: [email or web address]  Language: English  Hours: Mon 7:30 AM - 4:00 PM Tue 7:30 AM - 4:00 PM Wed 7:30 AM - 4:00 PM Thu 7:30 AM - 4:00 PM Fri 7:30 AM - 4:00 PM               IMPORTANT NUMBERS & WEBSITES        Emergency Services  911  .   [facility]  211 [email or web address]  .   Poison Control  [phone] http://mnpoison.org http://wisconsinpoison.org  .     Suicide and Crisis Lifeline  988 http://988[facility] Centerline.org  .   Childhelp [place] Child Abuse Hotline  [phone]  http://Childhelphotline.org   .   National Sexual Assault Hotline  (140) 928-4643 (HOPE) http://Rainn.org   .     National Runaway Safeline  (715) 853-8238 (RUNAWAY) http://Storage Genetics.org  .   Pregnancy & Postpartum Support  Call/text [phone]  [place]: http://ppsupportmn.org  [place]: http://Recommend.com/wi  .   Substance Abuse National Helpline ([facility])  592-740-HELP (7323) http://Findtreatment.gov   .                DISCLAIMER: These resources have been generated via the [employer] Platform. [employer] does not endorse any service providers mentioned in this resource list. [employer] does not guarantee that the services mentioned in this resource list will be available to you or will improve your health or wellness.    [facility]

## 2024-04-16 NOTE — PATIENT INSTRUCTIONS
Lab in [address]    To schedule your mammogram, you may call the breast center at [phone].     Colonoscopy - they will call you to set up     Amlodipine 5 mg twice daily for blood pressure      We will more than likely restart a cholesterol medication after lab are back     Ultrasound abdomen- they will call you to set up      Restart omeprazole twice daily for stomach pain

## 2024-04-17 ENCOUNTER — PATIENT OUTREACH (OUTPATIENT)
Dept: GASTROENTEROLOGY | Facility: CLINIC | Age: 74
End: 2024-04-17

## 2024-04-17 DIAGNOSIS — E78.00 HYPERCHOLESTEREMIA: Primary | ICD-10-CM

## 2024-04-17 LAB
ALBUMIN SERPL BCG-MCNC: 4.1 G/DL (ref 3.5–5.2)
ALP SERPL-CCNC: 84 U/L (ref 40–150)
ALT SERPL W P-5'-P-CCNC: 13 U/L (ref 0–50)
ANION GAP SERPL CALCULATED.3IONS-SCNC: 10 MMOL/L (ref 7–15)
AST SERPL W P-5'-P-CCNC: 23 U/L (ref 0–45)
BILIRUB SERPL-MCNC: 0.4 MG/DL
BUN SERPL-MCNC: 7.4 MG/DL (ref 8–23)
CALCIUM SERPL-MCNC: 9.9 MG/DL (ref 8.8–10.2)
CHLORIDE SERPL-SCNC: 101 MMOL/L (ref 98–107)
CHOLEST SERPL-MCNC: 242 MG/DL
CREAT SERPL-MCNC: 0.79 MG/DL (ref 0.51–0.95)
DEPRECATED HCO3 PLAS-SCNC: 28 MMOL/L (ref 22–29)
EGFRCR SERPLBLD CKD-EPI 2021: 79 ML/MIN/1.73M2
FASTING STATUS PATIENT QL REPORTED: NO
GLUCOSE SERPL-MCNC: 114 MG/DL (ref 70–99)
HDLC SERPL-MCNC: 44 MG/DL
LDLC SERPL CALC-MCNC: 147 MG/DL
NONHDLC SERPL-MCNC: 198 MG/DL
POTASSIUM SERPL-SCNC: 4 MMOL/L (ref 3.4–5.3)
PROT SERPL-MCNC: 8.3 G/DL (ref 6.4–8.3)
SODIUM SERPL-SCNC: 139 MMOL/L (ref 135–145)
TRIGL SERPL-MCNC: 255 MG/DL
TSH SERPL DL<=0.005 MIU/L-ACNC: 1.14 UIU/ML (ref 0.3–4.2)
VIT D+METAB SERPL-MCNC: 23 NG/ML (ref 20–50)

## 2024-04-17 RX ORDER — PRAVASTATIN SODIUM 20 MG
20 TABLET ORAL DAILY
Qty: 90 TABLET | Refills: 3 | Status: SHIPPED | OUTPATIENT
Start: 2024-04-17

## 2024-05-01 ENCOUNTER — HOSPITAL ENCOUNTER (OUTPATIENT)
Dept: MAMMOGRAPHY | Facility: CLINIC | Age: 74
Discharge: HOME | End: 2024-05-01
Attending: NURSE PRACTITIONER

## 2024-05-01 ENCOUNTER — HOSPITAL ENCOUNTER (OUTPATIENT)
Dept: ULTRASOUND IMAGING | Facility: CLINIC | Age: 74
Discharge: HOME | End: 2024-05-01
Attending: NURSE PRACTITIONER

## 2024-05-01 DIAGNOSIS — R10.11 RUQ ABDOMINAL PAIN: ICD-10-CM

## 2024-05-01 DIAGNOSIS — R16.0 HEPATOMEGALY: ICD-10-CM

## 2024-05-01 DIAGNOSIS — K76.0 FATTY LIVER: Primary | ICD-10-CM

## 2024-05-01 DIAGNOSIS — K76.9 LIVER LESION: ICD-10-CM

## 2024-05-01 DIAGNOSIS — Z12.31 VISIT FOR SCREENING MAMMOGRAM: ICD-10-CM

## 2024-05-01 PROCEDURE — 76700 US EXAM ABDOM COMPLETE: CPT

## 2024-05-01 PROCEDURE — 77067 SCR MAMMO BI INCL CAD: CPT

## 2024-05-03 ENCOUNTER — TELEPHONE (OUTPATIENT)
Dept: GASTROENTEROLOGY | Facility: CLINIC | Age: 74
End: 2024-05-03

## 2024-05-03 ENCOUNTER — HOSPITAL ENCOUNTER (OUTPATIENT)
Facility: CLINIC | Age: 74
End: 2024-05-03
Attending: SURGERY | Admitting: SURGERY

## 2024-05-03 NOTE — LETTER
5/3/2024      Concepcion Schumacher  [address] [place] [address]    Standard MiraLAX Bowel Prep  Prep Instructions for your Colonoscopy  Pre-Assessment Phone Number: [facility]; [phone] option 4    Please read these instructions carefully at least 7 days prior to your colonoscopy procedure. Be sure to follow all directions completely. The inside of your colon must be clean to allow for a complete examination for the presence of any growths, polyps, and/or abnormalities, as well as their biopsy or removal. A number of tips are included in order to make this part of the procedure as comfortable as possible.    Getting ready:   A nurse will call you to go over instructions and your health history.  It's important to complete the nurse assessment before your procedure. If you don't, your procedure might have to be canceled.  You must arrange for an adult to drive you home after your exam. Your colonoscopy cannot be done unless you have a ride. If you need to use public transportation, someone must ride with you and stay with you for a minimum of 24 hours.  Check with your insurance company to be sure they will cover this exam.  Go to the drug store and buy:  Four (4) - Dulcolax (Bisacodyl) 5mg tablets   8.3 ounce bottle of Miralax powder  64 ounces of Gatorade (not red or purple)     10 ounce bottle of clear magnesium citrate    7 days before the exam:  Talk to your prescribing provider: If you take blood thinners (such as Coumadin, Plavix, Xarelto, Eliquis, Lovenox, or others), these medications may need to be stopped temporarily before your procedure. Your prescribing provider will tell you what to do.   Talk to your prescribing provider: If you take prescription NSAIDS (such as Sulindac, Celebrex, Mobic, Relafen). Your prescribing provider will tell you what to do.   Stop taking fiber and iron supplements   Stop eating corn, popcorn, nuts and foods that contain seeds. These can stay in the colon  for many days and they can clog up the colonoscope.     3 days before the exam:  Begin a low-fiber diet (see below).   Drink at least 4 to 6 large glasses of water or sports drink (not red or purple) each day.     One day before the exam:  Only clear liquid diet is allowed (see below). No solid food should be eaten.   Drink at least 8 to 10 full glasses of clear liquids during the day.   Stop taking NSAID pain relievers, such as Advil, Ibuprofen, Motrin, etc.  You may take Tylenol.  Note: You will start drinking the colonoscopy prep solution at 5 PM. The timing of second step depends on your appointment arrival time. See Steps 1-2 below.    Step One:  At 4 PM, take 2 Dulcolax (Bisacodyl) tablets.   At 4 PM, mix the entire bottle of Miralax with 64 ounces of Gatorade in a pitcher and stir to dissolve the powder. Chill if desired, but do not add ice.   At 5 PM, start drinking an 8-ounce glass of the Miralax and Gatorade mixture every 15 minutes until the pitcher is HALF empty (about 4 glasses).  Store the rest in the refrigerator.   Bowel movements usually begin about one hour after your finish this first dose of Miralax. The stool is likely to be brown at this stage.   After you start drinking the solution, stay near a toilet. You may have watery stools (diarrhea), mild cramping, bloating , and nausea.   You may want to use Vaseline on the skin around your anus after each bowel movement to prevent irritation. You can also use wet wipes to prevent irritation.  Continue to drink clear liquids.     At 10 PM, take 2 Dulcolax (Bisacodyl) tablets  At 10 PM start drinking an 8-ounce glass of Miralax and Gatorade mixture every 15 minutes until the pitcher is empty (about 4 glasses).       Step Two:   If you arrive for your procedure before 11 AM:    6 hours prior to your scheduled arrival to the endoscopy unit drink 10 ounces of clear Magnesium Citrate    If you arrive for your procedure after 11 AM:     At 6 AM on the day  of the exam: drink 10 ounces of clear Magnesium Citrate        Day of exam:  You may drink clear liquids only up until 2 hours before your arrival time.  You may take your necessary morning medications with sips of water  Do not take diabetes medicine by mouth until after your exam.  Do not smoke or swallow anything, including water or gum for at least 2 hours before your arrival time. This is a safety issue. Your procedure could be cancelled if you do not follow directions.  No chewing tobacco 6 hours prior to procedure arrival time.   Please do not wear jewelry (i.e. earrings, rings, necklaces, watches, etc) . Leave your purse, billfold, credit cards, and other valuables at home.    Please arrive with a responsible adult who can take you home after the test and stay with you for a minimum of 24 hours: The medicine used will make you sleepy and forgetful. If you do not have someone to take you home, we will cancel your procedure. If using public transportation you must have someone to ride with you.  Please perform your nebulizer treatments and airway clearance therapy in the morning prior to the procedure (if applicable).  If you have asthma, bring your inhalers.       CLEAR LIQUID DIET   You may have:    Water, tea, coffee (no milk or cream)  Soda pop, Gatorade (not red or purple)  Jell-O, Popsicles (no milk or fruit pieces - not red or purple)  Fat-free soup broth or bouillon  Plain hard candy, such as clear life savers (not red or purple)  Clear juices and fruit-flavored drinks, such as apple juice, white grape juice, Hi-C, and Umair-Aid (not red or purple)   Do not have:    Milk or milk products such as ice cream, malts or shakes, or coffee creamer  Red or purple drinks of any kind such as cranberry juice or grape juice. Avoid red or purple Jell-O, Popsicles, Umair-Aid, sorbet, sherbet and candy  Juices with pulp such as orange, grapefruit, pineapple or tomato juice  Cream soups of any kind  Alcohol and  beer  Protein drinks or protein powder     LOW FIBER DIET   You may have:    Starches: White bread, rolls, biscuits, croissants, Karlie toast, white flour tortillas, waffles, pancakes, Maori toast; white rice, noodles, pasta, macaroni; cooked and peeled potatoes; plain crackers, saltines; cooked farina or cream of rice; puffed rice, corn flakes, Rice Krispies, Special K   Vegetables: tender cooked and canned, vegetable broths  Fruits and fruit juices: Strained fruit juice, canned fruit without seeds or skin (not pineapple), applesauce, pear sauce, ripe bananas, melons (not watermelon)   Milk products: Milk (plain or flavored), cheese, cottage cheese, yogurt (no berries), custard, ice cream    Proteins: Tender, well-cooked ground beef, lamb, veal, ham, pork, chicken, turkey, fish or organ meats; eggs; creamy peanut butter   Fats and condiments:  Margarine, butter, oils, mayonnaise, sour cream, salad dressing, plain gravy; spices, cooked herbs; sugar, clear jelly, honey, syrup   Snacks, sweets and drinks: Pretzels, hard candy; plain cakes and cookies (no nuts or seeds); gelatin, plain pudding, sherbet, Popsicles; coffee, tea, carbonated ( fizzy ) drinks Do not have:    Starches: Breads or rolls that contain nuts, seeds or fruit; whole wheat or whole grain breads that contain more than 1 gram of fiber per slice; cornbread; corn or whole wheat tortillas; potatoes with skin; brown rice, wild rice, kasha (buckwheat), and oatmeal   Vegetables: Any raw or steamed vegetables; vegetables with seeds; corn in any form   Fruits and fruit juices: Prunes, prune juice, raisins and other dried fruits, berries and other fruits with seeds, canned pineapple juices with pulp such as orange, grapefruit, pineapple or tomato juice  Milk products: Any yogurt with nuts, seeds or berries   Proteins: Tough, fibrous meats with gristle; cooked dried beans, peas or lentils; crunchy peanut butter  Fats and condiments: Pickles, olives, relish,  horseradish; jam, marmalade, preserves   Snacks, sweets and drinks: Popcorn, nuts, seeds, granola, coconut, candies made with nuts or seeds; all desserts that contain nuts, seeds, raisins and other dried fruits, coconut, whole grains or bran.      FAQ:    Why should Miralax be mixed with Gatorade or another clear sports drink?   It is important that your body does not develop an imbalance of electrolytes with the large volume of fluid in this prep. Gatorade/sports drinks contains those electrolytes.   Does Miralax have to be mixed with Gatorade?  Gatorade, Powerade, or any of the other sports drinks are acceptable. If you are diabetic, it is acceptable to use the low sugar options, such as Gatorade Zero, Powerade Zero, G2, etc.  Can I put ice in the Gatorade/Miralax mixture?  We prefer that you mix it and put it in the refrigerator to chill instead of using ice. The ice will melt and dilute the laxative.    Why should the Miralax prep be taken in several steps?   The stool is flushed out by a large wave of fluid going through the colon. Just sipping a large volume of the solution will not achieve the desired result. Studies have shown that two smaller waves (or more in some cases) are better than one large one.    Why are the second Miralax dose and Magnesium Citrate so close to the exam?   The intestine continues to produce mucus and waste. Longer intervals between the prep and the exam can lead to less than desired results. However, the stomach must be empty at the time of the exam in order to allow safe sedation. Therefore, there should be nothing by mouth 2 hours before the exam is started.   How do you know if your colon is cleaned out?   After completing the bowel prep, your bowel movements should be all liquid and yellow. Your bowel movements will look similar to urine in the toilet. If there are pieces of stool (poop) in the toilet, or if you can't see to the bottom of the toilet, please call our office for  advice. Call [phone] and ask to speak with a nurse.   Why do you need a responsible  to take you home and stay with you?  We require a responsible adult to take you home for your safety. The sedation medicines used to relax you during the procedure can impair your judgement and reaction time, and make you forgetful and possibly a little unsteady. Do not drive, make any important decisions, or sign any legal documents for 24 hours after your procedure.   It is normal to feel bloated and gassy after your procedure. Walking will help move the air through your colon. You can take non-aspirin pain relievers that contain acetaminophen (Tylenol).     When can you eat after your procedure?  You may resume your normal diet when you feel ready, unless advised otherwise by the doctor performing your procedure. Do not drink alcohol for 24 hours after your procedure.   You many resume normal activities (work, exercise, etc.) after 24 hours.     When will you get test results?  You should have your procedure results and any lab results (if applicable) by letter, MyChart message, or phone call within 2 weeks. If you have any questions, please call the doctor that referred you for the procedure.         Updated: 06/22/2022

## 2024-05-03 NOTE — TELEPHONE ENCOUNTER
"Endoscopy Scheduling Screen    Have you had a positive Covid test in the last 14 days?  No    What is your communication preference for Instructions and/or Bowel Prep?   Mail/USPS    What insurance is in the chart?  Other:  [facility]    Ordering/Referring Provider: JACQUIE WOLF    (If ordering provider performs procedure, schedule with ordering provider unless otherwise instructed. )    BMI: Estimated body mass index is 31.9 kg/m  as calculated from the following:    Height as of 4/16/24: 1.753 m (5' 9\").    Weight as of 4/16/24: 98 kg (216 lb).     Sedation Ordered  moderate sedation.   If patient BMI > 50 do not schedule in [facility].    If patient BMI > 45 do not schedule at [facility].    Are you taking methadone or Suboxone?  No    Have you had difficulties, pain, or discomfort during past endoscopy procedures?  No    Are you taking any prescription medications for pain 3 or more times per week?   NO, No RN review required.    Do you have a history of malignant hyperthermia?  No    (Females) Are you currently pregnant?        Have you been diagnosed or told you have pulmonary hypertension?   No    Do you have an LVAD?  No    Have you been told you have moderate to severe sleep apnea?  No    Have you been told you have COPD, asthma, or any other lung disease?  No    Do you have any heart conditions?  No     Have you ever had or are you waiting for an organ transplant?  No. Continue scheduling, no site restrictions.    Have you had a stroke or transient ischemic attack (TIA aka \"mini stroke\" in the last 6 months?   No    Have you been diagnosed with or been told you have cirrhosis of the liver?   No    Are you currently on dialysis?   No    Do you need assistance transferring?   No    BMI: Estimated body mass index is 31.9 kg/m  as calculated from the following:    Height as of 4/16/24: 1.753 m (5' 9\").    Weight as of 4/16/24: 98 kg (216 lb).     Is patients BMI > 40 and scheduling location [facility]?  No    Do you take an " injectable medication for weight loss or diabetes (excluding insulin)?  No    Do you take the medication Naltrexone?  No    Do you take blood thinners?  No       Prep   Are you currently on dialysis or do you have chronic kidney disease?  No    Do you have a diagnosis of diabetes?  No    Do you have a diagnosis of cystic fibrosis (CF)?  No    On a regular basis do you go 3 -5 days between bowel movements?  No    BMI > 40?  No    Preferred Pharmacy:    [id number] - [place], [place] - [address] [place] [address] [address]  [facility] [address]  Phone: [phone] Fax: [phone]    Final Scheduling Details     Procedure scheduled  Colonoscopy    Surgeon:  RIMA     Date of procedure:  7/11     Pre-OP / PAC:   No - Not required for this site.    Location  [facility] - Per order.    Sedation   Moderate Sedation - Per order.      Patient Reminders:   You will receive a call from a Nurse to review instructions and health history.  This assessment must be completed prior to your procedure.  Failure to complete the Nurse assessment may result in the procedure being cancelled.      On the day of your procedure, please designate an adult(s) who can drive you home stay with you for the next 24 hours. The medicines used in the exam will make you sleepy. You will not be able to drive.      You cannot take public transportation, ride share services, or non-medical taxi service without a responsible caregiver.  Medical transport services are allowed with the requirement that a responsible caregiver will receive you at your destination.  We require that drivers and caregivers are confirmed prior to your procedure.

## 2024-06-11 RX ORDER — ONDANSETRON 2 MG/ML
4 INJECTION INTRAMUSCULAR; INTRAVENOUS
Status: CANCELLED | OUTPATIENT
Start: 2024-06-11

## 2024-06-11 RX ORDER — LIDOCAINE 40 MG/G
CREAM TOPICAL
Status: CANCELLED | OUTPATIENT
Start: 2024-06-11

## 2024-06-30 ENCOUNTER — TELEPHONE (OUTPATIENT)
Dept: GASTROENTEROLOGY | Facility: CLINIC | Age: 74
End: 2024-06-30

## 2024-06-30 NOTE — LETTER
July 1, 2024      Concepcion DEVAN Endy  [address] [facility]   [place] [address]              Dear Concepcion,    Colonoscopy      Procedure date: 07.11.2024    Anticipated arrival time: 10:25 AM   (Please note that arrival times may change)    Facility location: [facility]; [address] [place] [address] Check in location: Main entrance, door #1 on the North side of the building under roundabout awning. DO NOT GO TO SURGERY/ED ENTRANCE.       Important Procedure Reminders:     Prep Instructions:   Instructions on how to prepare for your upcoming procedure are found below. Please read instructions carefully. Deviation from instructions may result in less than desired outcomes and procedure may need to be rescheduled.   If you have additional questions regarding how to prepare for your upcoming procedure, contact our endoscopy pre assessment nurses at [phone] option 4 Monday through Friday 7:00am-5:00pm     Policy:   The medications used during the procedure will make you sleepy, so you won't be able to drive. On the day of your procedure, please have an adult ready to drive you home and stay with you for the next 6 hours.   You can't use public transportation, ride-share services, or non-medical taxi services without a responsible caregiver. Medical transport services are okay, but a caregiver must be there to receive you at your destination.   Make sure your  and caregiver are confirmed before your procedure.      Day of procedure:  Please keep in mind that arrival times may change. Let your  know there might be a one-hour window for changes.  We ask that you please check in at the  with your . Your  should remain on campus.  Expect to be at the procedure center for about 1.5-2.5 hours.    Please do not wear jewelry (i.e. earrings, rings, necklaces, watches, etc). Leave your purse, billfold, credit cards, and other valuables at home.   Bring insurance card and  ID.     To cancel or reschedule your procedure:   Within 14 days of your procedure if you develop any flu-like symptoms (such as fever, cough, shortness of breath), COVID-19 like symptoms or exposure to COVID-19, contact our endoscopy team at [phone] option 4 to determine if procedure can be completed or needs to be delayed.   If you need to cancel or reschedule, our endoscopy scheduling team can be reached at [phone], option 2. Monday through Friday, 7:00am-5:00pm.      Medication Reminders:    Please note the following medication holding recommendations:   N/A    ----------------------------------------------------------------------------------------------------------------------------------------------------------------------------------------------------------------------------------------------------------------------    Standard Miralax Bowel Prep   Prep instructions for your colonoscopy   For prep questions, please call: [address] [facility] [place] [address] - [phone] option 4    Please read these instructions carefully at least 7 days prior to your colonoscopy procedure. Be sure to follow all directions completely. The inside of your colon must be clean to allow for a complete examination for the presence of any growths, polyps, and/or abnormalities, as well as their biopsy or removal. A number of tips are included in order to make this part of the procedure as comfortable as possible.    Getting ready   Purchase the following items over-the-counter/off the shelf at the drug store:    Four (4) - Dulcolax laxative (Bisacodyl) 5mg tablets (Do not use Dulcolax stool softener)   8.3 ounce bottle of Miralax powder (ClearLAX, SmoothLAX, PowderLAX)  64 ounces of Gatorade or similar sports drink. Not red or purple. (Pedialyte, Propel, Gatorade G2/Zero, Powerade, Powerade Zero)   10 ounce bottle of clear Magnesium Citrate    A nurse will call you to go over your appointment  details and prep instructions. Not completing the nurse call could result with your appointment being cancelled.    You must arrange for an adult to drive you home after your exam. Your colonoscopy cannot be done unless you have a ride. If you need to use public transportation, someone must ride with you and stay with you for up to 24 hours.       7 days before procedure     Consult with your prescribing provider about stopping any:     Diabetic/Weight Loss Injectable Medication GLP-1 agonist (such as Exenatide (Byetta, Bydureon),  Mounjaro (Tirzepatide). Ozempic (Semaglutide). Semaglutide. Symlin (Pamlintide), Tanzeum (Albiglutide). Tirzepatide-Weight Management (Zepbound), Trulicity (Dulaglutide), Victoza (Saxenda, Liraglutide), Wegovy (Semaglutide) please follow below guidelines for holding:    For weekly injection HOLD 7 days before procedure.  For once or twice a day injection HOLD the day before procedure and day of procedure.  For oral, daily dosing (Rybelsus) HOLD 7 days before procedure.    Blood thinning and/or anti platelet medications: such as Coumadin, Plavix, Xarelto, Eliquis, Lovenox or others, these medications may need to be stopped temporarily before your procedure.     If you take insulin for diabetes, ask your prescribing provider for instructions on how to take this medication while preparing for a colonoscopy.     NSAIDs medications such as Sulindac, Celebrex, Mobic, Relafen or others may need to be stopped before the procedure. This will be discussed during nurse review call, or you can reach out to your prescribing provider.      Stop taking iron (ferrous sulfate), multivitamins that contain iron, and/or fiber supplements (Metamucil, Benefiber, Psyllium husk powder, Fibercon, etc.).      Stop eating whole kernel corn, popcorn, nuts, and foods that contain seeds. These can stay in the colon for many days, and they can clog up the colonoscope.       3 days before procedure     Begin a low-fiber  diet (see examples below). No Olestra (a fat substitute).    Consume no more than 10-15 grams of fiber each day.     It is important to stay hydrated. Drink at least eight 8-ounce glasses of water a day.      LOW FIBER DIET   You can have:   Do not have:    Starches: White bread, rolls, biscuits, croissants, Karlie toast, white flour tortillas, waffles, pancakes, Nicaraguan toast; white rice, noodles, pasta, macaroni; cooked and peeled potatoes; plain crackers, saltines; cooked farina or cream of rice; puffed rice, corn flakes, Rice Krispies, Special K      Vegetables: tender cooked and canned, vegetable broths     Fruits and fruit juices: Strained fruit juice, canned fruit without seeds or skin (not pineapple), applesauce, pear sauce, ripe bananas, melons (not watermelon)     Milk products: Milk (plain or flavored), cheese, cottage cheese, yogurt (no berries), custard, ice cream       Proteins: Tender, well-cooked ground beef, lamb, veal, ham, pork, chicken, turkey, fish or organ meat, Tofu, eggs, creamy peanut butter      Fats and condiments:  Margarine, butter, oils, mayonnaise, sour cream, salad dressing, plain gravy; spices, cooked herbs; sugar, clear jelly, honey, syrup      Snacks, sweets and drinks: Pretzels, hard candy; plain cakes and cookies (no nuts or seeds); gelatin, plain pudding, sherbet, Popsicles; coffee, tea, carbonated ( fizzy ) drinks  Starches: Breads or rolls that contain nuts, seeds or fruit; whole wheat or whole grain breads that contain more than 2 grams of fiber per serving; cornbread; corn or whole wheat tortillas; potatoes with skin; brown rice, wild rice, quinoa, kasha (buckwheat), and oatmeal      Vegetables: Any raw or steamed vegetables; vegetables with seeds; corn in any form      Fruits and fruit juices: Prunes, prune juice, raisins and other dried fruits, berries and other fruits with seeds, canned pineapple juices with pulp such as orange, grapefruit, pineapple or tomato juice      Milk products: Any yogurt with nuts, seeds or berries      Proteins: Tough, fibrous meats with gristle; cooked dried beans, peas or lentils; crunchy peanut butter     Fats and condiments: Pickles, olives, relish, horseradish; jam, marmalade, preserves      Snacks, sweets and drinks: Popcorn, nuts, seeds, granola, coconut, candies made with nuts or seeds; all desserts that contain nuts, seeds, raisins and other dried fruits, coconut, whole grains or bran.       1 day before procedure       Start a clear liquid diet (see examples below). Do not eat any solid food.      Drink at least eight to ten 8-ounce glasses of water throughout the day. ? ? ? ? ? ? ? ?    Stop taking NSAIDs pain relievers, such as Advil, Ibuprofen, Motrin, etc. You may take Tylenol.      CLEAR LIQUID DIET:  You can have: Do not have:    Water, tea, coffee (no milk or cream)   Soda pop, Gatorade (not red or purple)   Coconut water   Jell-O, Popsicles (no milk or fruit pieces - not red or purple)   Fat-free soup broth or bouillon   Plain hard candy, such as clear life savers (not red or purple)   Clear juices and fruit-flavored drinks, such as apple juice, white grape juice, Hi-C, and Umair-Aid (not red or purple)  Milk or milk products such as ice cream, malts or shakes, or coffee creamer   Red or purple drinks of any kind such as cranberry juice, grape juice or Umair-Aid. Avoid red or purple Jell-O, Popsicles, sorbet, sherbet and candy   Juices with pulp such as orange, grapefruit, pineapple or tomato juice   Cream soups of any kind   Alcohol and beer   Protein drinks or protein powder     Step 1     At 4 PM, take 2 Dulcolax (Bisacodyl) tablets.   At 5 PM, mix the entire bottle of Miralax with 64 ounces of Gatorade in a pitcher and stir to dissolve the powder. Start drinking one 8-ounce glass of the Miralax and Gatorade mixture every 15 minutes until the pitcher is HALF empty (about 4 glasses).  Drink each glass quickly. Store the rest in the  refrigerator.   Continue to drink clear liquids.    Step 2     At 10 PM, take 2 Dulcolax (Bisacodyl) tablets  At 10 PM start drinking the remainder of the Miralax and Gatorade mixture. Drink one 8-ounce glass of Miralax and Gatorade mixture every 15 minutes until the pitcher is empty (about 4 glasses). Drink each glass quickly.     Step 3     If you arrive for your procedure BEFORE 11 AM:  6 hours prior to your scheduled arrival to the endoscopy unit, drink 10 ounces of clear Magnesium Citrate.    If you arrive for your procedure AFTER 11 AM:  At 6 AM on the day of the exam drink 10 ounces of clear Magnesium Citrate.       Reminders While Drinking Laxatives:     After you start drinking the solution, stay near a toilet. You may have watery stools (diarrhea), mild cramping, bloating, and nausea. You may want to use Vaseline on the skin around your anus after each bowel movement or use wet wipes to prevent irritation. Bowel movements will be liquid and dark in color at first and then should turn clear yellow in color.      Some find it easier to drink the Miralax and Gatorade mixture when it is chilled. Do not add ice as this will dilute the laxative. Drinking from a straw can be helpful to drink the liquid faster.     If you have nausea or vomiting during drinking the solution, rinse your mouth with water and take a 15-30 minute break and then continue drinking solution.       Day of procedure     2 hours before your arrival time stop drinking all liquids, including water.   Do not smoke or swallow anything, including water or gum for at least 2 hours before your arrival time. This is a safety issue. Your procedure could be cancelled if you do not follow directions.  No chewing tobacco 6 hours prior to procedure arrival time.     You may take your necessary morning medications with sips of water (4 ounces).   Do not take diabetes medicine by mouth until after your exam.  If you have asthma, bring your  inhalers.  Please perform your nebulizer treatments and airway clearance therapy in the morning prior to the procedure (if applicable).    Arrive with a responsible adult who can drive you home and stay with you for up to 24 hours. The medications used during the procedure will make you sleepy, so you won't be able to drive yourself home.   You cannot use public transportation, ride-share services, or non-medical taxi services without a responsible caregiver. Medical transport services are okay, but a caregiver must be there to receive you at your destination.  Please check in with your  when you arrive. Drivers should stay on campus.    Expect to be at the procedure center for about 1.5-2.5 hours.    Do not wear jewelry (i.e. earrings, rings, necklaces, watches, etc.). Leave your purse, billfold, credit cards, and other valuables at home.      Bring insurance card and ID.       Answers to Commonly Asked Questions     How soon can I eat after the procedure?  You may resume your normal diet when you feel ready, unless advised otherwise by the doctor performing your procedure. We recommend starting with a light meal.   Do not drink alcohol for 24 hours after your procedure.  You may resume normal activities (work, exercise, etc.) after 24 hours.    How might I feel after the procedure?  It is normal to feel bloated and gassy after your procedure. Walking will help move the air through your colon. You can take non-aspirin pain relievers that contain acetaminophen (Tylenol).  If you are having sedation, we require a responsible adult to take you home for your safety. The sedation medicines used to relax you during the procedure can impair your judgement and reaction time, and make you forgetful and possibly a little unsteady.  Do not drive, make any important decisions, or sign any legal documents for 24 hours after your procedure.    When will I get my test results?  You should have your procedure results and any  lab results (if applicable) by letter, MyChart message, or phone call within 2 weeks. If you have any questions, please call the doctor that referred you for the procedure.    How do I know if my colon is cleaned out?   After completing the bowel prep, your bowel movements should be all liquid and yellow. Your bowel movements will look similar to urine in the toilet. If there are pieces of stool (poop) in the toilet, or if you can't see to the bottom of the toilet, please call our office for advice. Call [phone] and ask to speak with a nurse.    Why is the Miralax bowel prep taken in several steps?   The stool is flushed out by a large wave of fluid going through the colon. Just sipping a large volume of the solution will not achieve the desired result. Studies have shown that two smaller waves (or more in some cases) are better than one large one.      Why do I need to drink the magnesium citrate so close to the procedure arrival time?   The intestine continues to produce mucus and waste. Longer intervals between the prep and the exam can lead to less than desired results. However, the stomach must be empty at the time of the exam in order to allow safe sedation. Therefore, there should be nothing by mouth 2 hours before the exam is started.    What if I need to cancel or reschedule my procedure?  Contact our endoscopy scheduling team at [phone], option 2. Monday through Friday, 7:00am-5:00pm.               Sincerely,      [facility]

## 2024-06-30 NOTE — TELEPHONE ENCOUNTER
Pre visit planning completed.      Procedure details:    Patient scheduled for Colonoscopy  on 07.11.2024.     Arrival time: 1025. Procedure time 1110    Facility location: [facility]; [address] [place] [address]. Check in location: Main entrance, door #1 on the North side of the building under roundabout awning. DO NOT GO TO SURGERY/ED ENTRANCE.     Sedation type: Conscious sedation     Pre op exam needed? N/A    Indication for procedure:   Screen for colon cancer   History of colonic polyps         Chart review:     Electronic implanted devices? No    Recent diagnosis of diverticulitis within the last 6 weeks? No    Diabetic? No      Medication review:    Anticoagulants? No    NSAIDS? No NSAID medications per patient's medication list.  RN will verify with pre-assessment call.    Other medication HOLDING recommendations:  N/A      Prep for procedure:     Bowel prep recommendation: Standard Miralax  Due to: standard bowel prep.    Prep instructions sent via letter CRC team          Rosa Elena Ortega RN  Endoscopy Procedure Pre Assessment RN  [phone] option 4

## 2024-07-01 NOTE — TELEPHONE ENCOUNTER
Pre assessment completed for upcoming procedure with patients raisa Schumacher (consent to communicate on file)  (Please see previous telephone encounter notes for complete details)        Procedure details:    Arrival time and facility location reviewed.    Pre op exam needed? N/A    Designated  policy reviewed. Instructed to have someone stay 6  hours post procedure.       Medication review:    Medications reviewed. Please see supporting documentation below. Holding recommendations discussed (if applicable).   N/A      Prep for procedure:     Procedure prep instructions reviewed.        Any additional information needed:  Raisa requested email to address on file and new letter sent with the instructions.       Patient  verbalized understanding and had no questions or concerns at this time.      Rosa Elena Ortega RN  Endoscopy Procedure Pre Assessment   [phone] option 4

## 2024-07-02 ENCOUNTER — TELEPHONE (OUTPATIENT)
Dept: GASTROENTEROLOGY | Facility: CLINIC | Age: 74
End: 2024-07-02

## 2024-07-02 NOTE — TELEPHONE ENCOUNTER
Caller:     Reason for Reschedule/Cancellation   (please be detailed, any staff messages or encounters to note?): NO INSURANCE PER INBASKET      Prior to reschedule please review:  Ordering Provider:     JACQUIE WOLF Determined: CS  Does patient have any ASC Exclusions, please identify?:       Notes on Cancelled Procedure:  Procedure: Lower Endoscopy [Colonoscopy]   Date: 7/11  Location: [facility]; [address] [place] [address]  Surgeon: RIMA      Rescheduled: No,         Did you cancel or rescheduled an EUS procedure? No.

## 2024-07-11 ENCOUNTER — SURGERY (OUTPATIENT)
Age: 74
End: 2024-07-11

## 2025-04-07 ENCOUNTER — APPOINTMENT (OUTPATIENT)
Dept: CT IMAGING | Facility: CLINIC | Age: 75
End: 2025-04-07
Attending: EMERGENCY MEDICINE
Payer: COMMERCIAL

## 2025-04-07 ENCOUNTER — HOSPITAL ENCOUNTER (EMERGENCY)
Facility: CLINIC | Age: 75
Discharge: HOME | End: 2025-04-07
Attending: EMERGENCY MEDICINE | Admitting: EMERGENCY MEDICINE
Payer: COMMERCIAL

## 2025-04-07 VITALS
RESPIRATION RATE: 18 BRPM | TEMPERATURE: 97 F | HEART RATE: 65 BPM | WEIGHT: 220.02 LBS | OXYGEN SATURATION: 100 % | DIASTOLIC BLOOD PRESSURE: 81 MMHG | BODY MASS INDEX: 34.53 KG/M2 | SYSTOLIC BLOOD PRESSURE: 167 MMHG | HEIGHT: 67 IN

## 2025-04-07 DIAGNOSIS — R07.89 LEFT-SIDED CHEST WALL PAIN: ICD-10-CM

## 2025-04-07 DIAGNOSIS — R79.89 ELEVATED LFTS: ICD-10-CM

## 2025-04-07 DIAGNOSIS — R91.8 PULMONARY NODULES: ICD-10-CM

## 2025-04-07 DIAGNOSIS — R59.0 HILAR LYMPHADENOPATHY: ICD-10-CM

## 2025-04-07 LAB
ALBUMIN SERPL BCG-MCNC: 4.1 G/DL (ref 3.5–5.2)
ALP SERPL-CCNC: 116 U/L (ref 40–150)
ALT SERPL W P-5'-P-CCNC: 401 U/L (ref 0–50)
ANION GAP SERPL CALCULATED.3IONS-SCNC: 12 MMOL/L (ref 7–15)
AST SERPL W P-5'-P-CCNC: 369 U/L (ref 0–45)
ATRIAL RATE - MUSE: 66 BPM
BASOPHILS # BLD AUTO: 0 10E3/UL (ref 0–0.2)
BASOPHILS NFR BLD AUTO: 0 %
BILIRUB SERPL-MCNC: 0.6 MG/DL
BUN SERPL-MCNC: 5 MG/DL (ref 8–23)
CALCIUM SERPL-MCNC: 9.2 MG/DL (ref 8.8–10.4)
CHLORIDE SERPL-SCNC: 97 MMOL/L (ref 98–107)
CREAT SERPL-MCNC: 0.67 MG/DL (ref 0.51–0.95)
DIASTOLIC BLOOD PRESSURE - MUSE: NORMAL MMHG
EGFRCR SERPLBLD CKD-EPI 2021: >90 ML/MIN/1.73M2
EOSINOPHIL # BLD AUTO: 0 10E3/UL (ref 0–0.7)
EOSINOPHIL NFR BLD AUTO: 0 %
ERYTHROCYTE [DISTWIDTH] IN BLOOD BY AUTOMATED COUNT: 13.9 % (ref 10–15)
GLUCOSE SERPL-MCNC: 101 MG/DL (ref 70–99)
HCO3 SERPL-SCNC: 26 MMOL/L (ref 22–29)
HCT VFR BLD AUTO: 45.2 % (ref 35–47)
HGB BLD-MCNC: 15.2 G/DL (ref 11.7–15.7)
HOLD SPECIMEN: NORMAL
HOLD SPECIMEN: NORMAL
IMM GRANULOCYTES # BLD: 0 10E3/UL
IMM GRANULOCYTES NFR BLD: 1 %
INTERPRETATION ECG - MUSE: NORMAL
LIPASE SERPL-CCNC: 50 U/L (ref 13–60)
LYMPHOCYTES # BLD AUTO: 2.6 10E3/UL (ref 0.8–5.3)
LYMPHOCYTES NFR BLD AUTO: 35 %
MCH RBC QN AUTO: 27.9 PG (ref 26.5–33)
MCHC RBC AUTO-ENTMCNC: 33.6 G/DL (ref 31.5–36.5)
MCV RBC AUTO: 83 FL (ref 78–100)
MONOCYTES # BLD AUTO: 0.6 10E3/UL (ref 0–1.3)
MONOCYTES NFR BLD AUTO: 8 %
NEUTROPHILS # BLD AUTO: 4.2 10E3/UL (ref 1.6–8.3)
NEUTROPHILS NFR BLD AUTO: 56 %
NRBC # BLD AUTO: 0 10E3/UL
NRBC BLD AUTO-RTO: 0 /100
P AXIS - MUSE: 57 DEGREES
PLATELET # BLD AUTO: 235 10E3/UL (ref 150–450)
POTASSIUM SERPL-SCNC: 3.7 MMOL/L (ref 3.4–5.3)
PR INTERVAL - MUSE: 178 MS
PROT SERPL-MCNC: 8.5 G/DL (ref 6.4–8.3)
QRS DURATION - MUSE: 86 MS
QT - MUSE: 352 MS
QTC - MUSE: 369 MS
R AXIS - MUSE: 30 DEGREES
RBC # BLD AUTO: 5.44 10E6/UL (ref 3.8–5.2)
SODIUM SERPL-SCNC: 135 MMOL/L (ref 135–145)
SYSTOLIC BLOOD PRESSURE - MUSE: NORMAL MMHG
T AXIS - MUSE: 59 DEGREES
TROPONIN T SERPL HS-MCNC: <6 NG/L
VENTRICULAR RATE- MUSE: 66 BPM
WBC # BLD AUTO: 7.4 10E3/UL (ref 4–11)

## 2025-04-07 PROCEDURE — 74177 CT ABD & PELVIS W/CONTRAST: CPT

## 2025-04-07 PROCEDURE — 250N000013 HC RX MED GY IP 250 OP 250 PS 637: Performed by: EMERGENCY MEDICINE

## 2025-04-07 PROCEDURE — 99285 EMERGENCY DEPT VISIT HI MDM: CPT | Mod: 25

## 2025-04-07 PROCEDURE — 84484 ASSAY OF TROPONIN QUANT: CPT | Performed by: EMERGENCY MEDICINE

## 2025-04-07 PROCEDURE — 250N000009 HC RX 250: Performed by: EMERGENCY MEDICINE

## 2025-04-07 PROCEDURE — 85014 HEMATOCRIT: CPT | Performed by: EMERGENCY MEDICINE

## 2025-04-07 PROCEDURE — 93005 ELECTROCARDIOGRAM TRACING: CPT

## 2025-04-07 PROCEDURE — 85025 COMPLETE CBC W/AUTO DIFF WBC: CPT | Performed by: EMERGENCY MEDICINE

## 2025-04-07 PROCEDURE — 250N000011 HC RX IP 250 OP 636: Performed by: EMERGENCY MEDICINE

## 2025-04-07 PROCEDURE — 71260 CT THORAX DX C+: CPT

## 2025-04-07 PROCEDURE — 80053 COMPREHEN METABOLIC PANEL: CPT | Performed by: EMERGENCY MEDICINE

## 2025-04-07 PROCEDURE — 83690 ASSAY OF LIPASE: CPT | Performed by: EMERGENCY MEDICINE

## 2025-04-07 PROCEDURE — 96374 THER/PROPH/DIAG INJ IV PUSH: CPT | Mod: 59

## 2025-04-07 PROCEDURE — 36415 COLL VENOUS BLD VENIPUNCTURE: CPT | Performed by: EMERGENCY MEDICINE

## 2025-04-07 PROCEDURE — 96375 TX/PRO/DX INJ NEW DRUG ADDON: CPT

## 2025-04-07 RX ORDER — IOPAMIDOL 755 MG/ML
500 INJECTION, SOLUTION INTRAVASCULAR ONCE
Status: COMPLETED | OUTPATIENT
Start: 2025-04-07 | End: 2025-04-07

## 2025-04-07 RX ORDER — LIDOCAINE 4 G/G
2 PATCH TOPICAL EVERY 24 HOURS
Qty: 14 PATCH | Refills: 0 | Status: SHIPPED | OUTPATIENT
Start: 2025-04-07 | End: 2025-04-14

## 2025-04-07 RX ORDER — LIDOCAINE 4 G/G
2 PATCH TOPICAL ONCE
Status: DISCONTINUED | OUTPATIENT
Start: 2025-04-07 | End: 2025-04-07 | Stop reason: HOSPADM

## 2025-04-07 RX ORDER — KETOROLAC TROMETHAMINE 15 MG/ML
10 INJECTION, SOLUTION INTRAMUSCULAR; INTRAVENOUS ONCE
Status: COMPLETED | OUTPATIENT
Start: 2025-04-07 | End: 2025-04-07

## 2025-04-07 RX ORDER — ONDANSETRON 2 MG/ML
4 INJECTION INTRAMUSCULAR; INTRAVENOUS ONCE
Status: COMPLETED | OUTPATIENT
Start: 2025-04-07 | End: 2025-04-07

## 2025-04-07 RX ADMIN — ONDANSETRON 4 MG: 2 INJECTION, SOLUTION INTRAMUSCULAR; INTRAVENOUS at 11:14

## 2025-04-07 RX ADMIN — KETOROLAC TROMETHAMINE 10 MG: 15 INJECTION, SOLUTION INTRAMUSCULAR; INTRAVENOUS at 11:14

## 2025-04-07 RX ADMIN — SODIUM CHLORIDE 64 ML: 9 INJECTION, SOLUTION INTRAVENOUS at 12:33

## 2025-04-07 RX ADMIN — IOPAMIDOL 100 ML: 755 INJECTION, SOLUTION INTRAVENOUS at 12:33

## 2025-04-07 RX ADMIN — LIDOCAINE 2 PATCH: 4 PATCH TOPICAL at 11:10

## 2025-04-07 ASSESSMENT — ACTIVITIES OF DAILY LIVING (ADL)
ADLS_ACUITY_SCORE: 41

## 2025-04-07 NOTE — DISCHARGE INSTRUCTIONS
No signs of emergency today - specifically no heart or lung problem causing the pain. I suspect a rib injury related to her coughing that is slow to heal.     Maximum doses of OTC medication: tylenol ,1000mg every 6 hours as needed and/or ibuprofen 600mg every 6 hours as needed. It's safe to take these at the same time.     Can use lidoderm patches as well.     There are some swollen lymph nodes on her CT that I don't think are causing her pain, but will need a repeat CT scan in 3-6 months.     Follow-up with your doctor later this week to recheck your pain and to discuss follow-up imaging.     Return to emergency department for fever greater than 100.4, worsening/uncontrollable pain, shortness of breath/inability to breathe, or for any other concerns.

## 2025-04-07 NOTE — ED TRIAGE NOTES
Pt comes in with left flank pain/chest pain.  She states the pain is a 9/10.  She was seen in  at [facility] this morning and a few days ago for similar symptoms.  She was sent to ED for further workup     Triage Assessment (Adult)       Row Name 04/07/25 1024          Triage Assessment    Airway WDL WDL        Respiratory WDL    Respiratory WDL WDL        Cardiac WDL    Cardiac WDL WDL

## 2025-04-07 NOTE — ED PROVIDER NOTES
"  Emergency Department Note      History of Present Illness     Chief Complaint   Chest Pain      HPI   Concepcion Schumacher is a 75 year old female with history of hypertension and hyperparathyroidism who presents to the ED with her son for chest pain. The patient's son is at bedside providing history. The patient developed left sided rib pain a month ago after a lot of coughing due to a cold. The pain is described as a sharp sensation that starts where the lower ribs attach to the spine and wrap around the torso. The pain has remained persistent since onset. The patient has tried applying ice to the painful area with no relief. She was seen in urgent care 2 weeks ago where she was prescribed analgesics which haven't improved her pain. She has also been experiencing intermittent episodes of vomiting. Her last episode started at 0500 this morning and ended an hour later. The patient wanted to be seen in the ED because her pain has worsened recently. Denies fever.     Independent Historian   Son as detailed above.    Review of External Notes   I reviewed the urgent care visit from earlier today. A d dimer was taken to work up unspecified chest pain.   I reviewed the urgent care visit from 3/20/25. Seen for left sided rib pain.    Past Medical History     Medical History and Problem List   Hypertension   Hyperparathyroidism     Medications   Amlodipine   Omeprazole  Pravastatin     Surgical History   Parathyroidectomy     Physical Exam     Patient Vitals for the past 24 hrs:   BP Temp Temp src Pulse Resp SpO2 Height Weight   04/07/25 1026 (!) 196/147 97  F (36.1  C) Temporal 73 18 100 % 1.702 m (5' 7\") 99.8 kg (220 lb 0.3 oz)     Physical Exam  Nursing note and vitals reviewed.  HENT:   Mouth/Throat: Moist mucous membranes.   Eyes: EOMI, nonicteric sclera  Cardiovascular: Normal rate, regular rhythm, no murmurs, rubs, or gallops  Pulmonary/Chest: Effort normal and breath sounds normal. No respiratory distress. No wheezes. No " rales. Pain to palpation left lower later ribs.   Abdominal: Soft. Nontender, nondistended, no guarding or rigidity.   Musculoskeletal: Normal range of motion.   Neurological: Alert. Moves all extremities spontaneously.   Skin: Skin is warm and dry. No shingles rash noted.         Diagnostics     Lab Results   Labs Ordered and Resulted from Time of ED Arrival to Time of ED Departure   COMPREHENSIVE METABOLIC PANEL - Abnormal       Result Value    Sodium 135      Potassium 3.7      Carbon Dioxide (CO2) 26      Anion Gap 12      Urea Nitrogen 5.0 (*)     Creatinine 0.67      GFR Estimate >90      Calcium 9.2      Chloride 97 (*)     Glucose 101 (*)     Alkaline Phosphatase 116       (*)      (*)     Protein Total 8.5 (*)     Albumin 4.1      Bilirubin Total 0.6     CBC WITH PLATELETS AND DIFFERENTIAL - Abnormal    WBC Count 7.4      RBC Count 5.44 (*)     Hemoglobin 15.2      Hematocrit 45.2      MCV 83      MCH 27.9      MCHC 33.6      RDW 13.9      Platelet Count 235      % Neutrophils 56      % Lymphocytes 35      % Monocytes 8      % Eosinophils 0      % Basophils 0      % Immature Granulocytes 1      NRBCs per 100 WBC 0      Absolute Neutrophils 4.2      Absolute Lymphocytes 2.6      Absolute Monocytes 0.6      Absolute Eosinophils 0.0      Absolute Basophils 0.0      Absolute Immature Granulocytes 0.0      Absolute NRBCs 0.0     LIPASE - Normal    Lipase 50     TROPONIN T, HIGH SENSITIVITY - Normal    Troponin T, High Sensitivity <6         Imaging   CT Chest/Abdomen/Pelvis w Contrast   Final Result   IMPRESSION:   1.  There are multiple conspicuous bilateral axillary as well as borderline right hilar and subcarinal lymph nodes. These are of indeterminate clinical significance in the absence of a known malignancy. Close imaging surveillance is recommended. Consider    repeat CT in 3-6 months.   2.  Multiple noncalcified solid pulmonary nodules measuring up to 0.4 cm. See below for follow-up  recommendations.   3.  Circumferential urinary bladder wall thickening is likely due to underdistention. Correlate with urinalysis for possible cystitis.      REFERENCE:   Guidelines for Management of Incidental Pulmonary Nodules Detected on CT Images: From the Fleischner Society 2017.    Guidelines apply to incidental nodules in patients who are 35 years or older.   Guidelines do not apply to lung cancer screening, patients with immunosuppression, or patients with known primary cancer.      MULTIPLE NODULES   Nodule size <6 mm   Low-risk patients: No follow-up needed.   High-risk patients: Optional follow-up at 12 months.                 EKG   ECG taken at 1035, ECG read at 1037  Normal sinus rhythm  Nonspecific T wave abnormality    Rate 66 bpm. FL interval 178 ms. QRS duration 86 ms. QT/QTc 352/369 ms. P-R-T axes 57 30 59.    Independent Interpretation   None    ED Course      Medications Administered   Medications   Lidocaine (LIDOCARE) 4 % Patch 2 patch (2 patches Transdermal $Patch/Med Applied 4/7/25 1110)   ondansetron (ZOFRAN) injection 4 mg (4 mg Intravenous $Given 4/7/25 1114)   ketorolac (TORADOL) injection 10 mg (10 mg Intravenous $Given 4/7/25 1114)   sodium chloride 0.9 % bag for CT scan flush (64 mLs Intravenous $Given 4/7/25 1233)   iopamidol (ISOVUE-370) solution 500 mL (100 mLs Intravenous $Given 4/7/25 1233)       Procedures   Procedures     Discussion of Management   None    ED Course   ED Course as of 04/07/25 1330   Mon Apr 07, 2025   1037 I obtained history and performed physical exam.    1329 I rechecked the patient. She is ready for discharge.       Additional Documentation  None    Medical Decision Making / Diagnosis     CMS Diagnoses: None    MIPS       None    [facility]   Concepcion Schumacher is a 75 year old female who presents with chief complaint left-sided rib pain.  This began about 2-3 weeks ago in the context of frequent coughing. Infectious illness has since resolved, but now she has persistent  left-sided rib pain. Pain reproducible on exam. Suspect muscular injury vs rib fracture vs other. Pt seen @ urgent care and had negative d-dimer ruling out PE. EKG without evidence of ischemia here. Troponin undetectable which is sufficient to rule out ACS. I elected to CT chest/abd/pel to evaluate for the above, but also to evaluate for elevated AST/ALT as well as possible upper abdominal pathology to explain pain. CT negative for emergent pathology. Uncertain cause of pt's pain other than musculoskeletal cause. Cause of LFT elevation uncertain as well. Incidental CT findings noted including lymphadenopathy, pulmonary nodules, as well as possible liver hypodensities.  Discussed with pt's son that these will need follow-up in the outpt setting - likely with repeat imaging. He inquired to cause and I discussed infectious vs malignant etiology, but it is uncertain at this time. He also inquired as to pain control. We discussed beyond tylenol/ibuprofen and topical treatments that there is little much other than narcotics of which pt/son decline. We discussed max dosing of tylenolibuprofen. Lidoderm prescribed. I recommended primary care follow-up later this week for lab, pain, & symptom recheck. ED return encouraged for worsening symptoms. She is in stable condition at the time of discharge, red flags that should merit ED return were discussed as well as recommended follow-up instructions. All questions were answered.      Disposition   The patient was discharged.     Diagnosis     ICD-10-CM    1. Left-sided chest wall pain  R07.89       2. Hilar lymphadenopathy  R59.0       3. Elevated LFTs  R79.89            Discharge Medications   Discharge Medication List as of 4/7/2025  2:01 PM        START taking these medications    Details   Lidocaine (LIDOCARE) 4 % Patch Place 2 patches over 12 hours onto the skin every 24 hours for 7 days. To prevent lidocaine toxicity, patient should be patch free for 12 hrs daily. Don't  place heat over patch.Disp-14 patch, [id number]               Scribe Disclosure:  I, Swapnil Nunez, am serving as a scribe at 10:51 AM on 4/7/2025 to document services personally performed by Rodrigo Medrano MD based on my observations and the provider's statements to me.        Rodrigo Medrano MD  04/08/25 [id number]

## 2025-04-08 ENCOUNTER — TELEPHONE (OUTPATIENT)
Dept: INTERNAL MEDICINE | Facility: CLINIC | Age: 75
End: 2025-04-08
Payer: COMMERCIAL

## 2025-04-08 NOTE — TELEPHONE ENCOUNTER
Reason for Call:  Appointment Request    Patient requesting this type of appt:  Preventive     Requested provider: Diana Phillips    Reason patient unable to be scheduled: Not within requested timeframe    When does patient want to be seen/preferred time:  ASAP    Comments: Patient is wanting to come in for annual wellness and PCP is not populating any appointments    Okay to leave a detailed message?: Yes at Other phone number:  [phone]    Call taken on 4/8/2025 at 9:23 AM by Acacia Wilson

## 2025-04-09 NOTE — TELEPHONE ENCOUNTER
Attempted to call sonTaco back but his phone was not allowing incoming calls so unable to leave message.     Attempt # 1  Called Phone # [phone]      Using Dwllr  # [id number] at 10:54 AM 4/9/2025  spoke with patient's son regarding appointment request. Appointment set up.       Apr 11, 2025 12:30 PM  (Arrive by 12:10 PM)  Annual Wellness Visit with HOLLY Sylvester CNP  [facility] ([facility] ) [phone]     Son says intepreter is not needed, so asking to remove this from patient's chart. Removed  needed from patient's chart.    Thank you,  aMtt, Triage RN [facility]   10:54 AM 4/9/2025

## 2025-04-10 NOTE — TELEPHONE ENCOUNTER
Patient has [employer] medical assistance and we are not contracted with this. Appointment had to be canceled.

## 2025-04-14 ENCOUNTER — TELEPHONE (OUTPATIENT)
Dept: INTERNAL MEDICINE | Facility: CLINIC | Age: 75
End: 2025-04-14
Payer: COMMERCIAL

## 2025-04-14 NOTE — TELEPHONE ENCOUNTER
Patient Quality Outreach    Patient is due for the following:   Hypertension -  Hypertension follow-up visit  Colon Cancer Screening  Physical Annual Wellness Visit      Topic Date Due    Diptheria Tetanus Pertussis (DTAP/TDAP/TD) Vaccine (1 - Tdap) Never done    Pneumococcal Vaccine (1 of 1 - PCV) Never done    Zoster (Shingles) Vaccine (1 of 2) Never done    Flu Vaccine (1) 09/01/2024    COVID-19 Vaccine (3 - 2024-25 season) 09/01/2024       Action(s) Taken:   Schedule a Annual Wellness Visit    Type of outreach:    Phone, left message for patient/parent to call back.    Questions for provider review:    None         Erin Hanna LPN  Chart routed to None.

## 2025-04-15 ENCOUNTER — HOSPITAL ENCOUNTER (EMERGENCY)
Facility: CLINIC | Age: 75
Discharge: HOME | End: 2025-04-15
Attending: EMERGENCY MEDICINE | Admitting: EMERGENCY MEDICINE
Payer: COMMERCIAL

## 2025-04-15 ENCOUNTER — APPOINTMENT (OUTPATIENT)
Dept: MRI IMAGING | Facility: CLINIC | Age: 75
End: 2025-04-15
Attending: EMERGENCY MEDICINE
Payer: COMMERCIAL

## 2025-04-15 ENCOUNTER — APPOINTMENT (OUTPATIENT)
Dept: GENERAL RADIOLOGY | Facility: CLINIC | Age: 75
End: 2025-04-15
Attending: EMERGENCY MEDICINE
Payer: COMMERCIAL

## 2025-04-15 VITALS
BODY MASS INDEX: 34.36 KG/M2 | OXYGEN SATURATION: 99 % | WEIGHT: 219.36 LBS | HEART RATE: 61 BPM | RESPIRATION RATE: 18 BRPM | TEMPERATURE: 97.9 F | SYSTOLIC BLOOD PRESSURE: 172 MMHG | DIASTOLIC BLOOD PRESSURE: 96 MMHG

## 2025-04-15 DIAGNOSIS — R79.89 ELEVATED LIVER FUNCTION TESTS: ICD-10-CM

## 2025-04-15 DIAGNOSIS — M54.12 CERVICAL RADICULOPATHY: ICD-10-CM

## 2025-04-15 LAB
ALBUMIN SERPL BCG-MCNC: 3.9 G/DL (ref 3.5–5.2)
ALBUMIN UR-MCNC: 20 MG/DL
ALP SERPL-CCNC: 103 U/L (ref 40–150)
ALT SERPL W P-5'-P-CCNC: 247 U/L (ref 0–50)
ANION GAP SERPL CALCULATED.3IONS-SCNC: 14 MMOL/L (ref 7–15)
APPEARANCE UR: CLEAR
AST SERPL W P-5'-P-CCNC: 232 U/L (ref 0–45)
BACTERIA #/AREA URNS HPF: ABNORMAL /HPF
BASOPHILS # BLD AUTO: 0 10E3/UL (ref 0–0.2)
BASOPHILS NFR BLD AUTO: 0 %
BILIRUB DIRECT SERPL-MCNC: 0.2 MG/DL (ref 0–0.3)
BILIRUB SERPL-MCNC: 0.8 MG/DL
BILIRUB UR QL STRIP: NEGATIVE
BUN SERPL-MCNC: 5.7 MG/DL (ref 8–23)
CALCIUM SERPL-MCNC: 9.2 MG/DL (ref 8.8–10.4)
CHLORIDE SERPL-SCNC: 93 MMOL/L (ref 98–107)
COLOR UR AUTO: ABNORMAL
CREAT SERPL-MCNC: 0.69 MG/DL (ref 0.51–0.95)
EGFRCR SERPLBLD CKD-EPI 2021: >90 ML/MIN/1.73M2
EOSINOPHIL # BLD AUTO: 0 10E3/UL (ref 0–0.7)
EOSINOPHIL NFR BLD AUTO: 0 %
ERYTHROCYTE [DISTWIDTH] IN BLOOD BY AUTOMATED COUNT: 13.6 % (ref 10–15)
GLUCOSE SERPL-MCNC: 101 MG/DL (ref 70–99)
GLUCOSE UR STRIP-MCNC: NEGATIVE MG/DL
HCO3 SERPL-SCNC: 26 MMOL/L (ref 22–29)
HCT VFR BLD AUTO: 43.6 % (ref 35–47)
HGB BLD-MCNC: 14.5 G/DL (ref 11.7–15.7)
HGB UR QL STRIP: NEGATIVE
HOLD SPECIMEN: NORMAL
HOLD SPECIMEN: NORMAL
IMM GRANULOCYTES # BLD: 0.1 10E3/UL
IMM GRANULOCYTES NFR BLD: 1 %
KETONES UR STRIP-MCNC: NEGATIVE MG/DL
LEUKOCYTE ESTERASE UR QL STRIP: ABNORMAL
LIPASE SERPL-CCNC: 44 U/L (ref 13–60)
LYMPHOCYTES # BLD AUTO: 1.9 10E3/UL (ref 0.8–5.3)
LYMPHOCYTES NFR BLD AUTO: 29 %
MCH RBC QN AUTO: 27.6 PG (ref 26.5–33)
MCHC RBC AUTO-ENTMCNC: 33.3 G/DL (ref 31.5–36.5)
MCV RBC AUTO: 83 FL (ref 78–100)
MONOCYTES # BLD AUTO: 0.5 10E3/UL (ref 0–1.3)
MONOCYTES NFR BLD AUTO: 7 %
MUCOUS THREADS #/AREA URNS LPF: PRESENT /LPF
NEUTROPHILS # BLD AUTO: 4.3 10E3/UL (ref 1.6–8.3)
NEUTROPHILS NFR BLD AUTO: 63 %
NITRATE UR QL: NEGATIVE
NRBC # BLD AUTO: 0 10E3/UL
NRBC BLD AUTO-RTO: 0 /100
PH UR STRIP: 7 [PH] (ref 5–7)
PLATELET # BLD AUTO: 244 10E3/UL (ref 150–450)
POTASSIUM SERPL-SCNC: 3.8 MMOL/L (ref 3.4–5.3)
PROT SERPL-MCNC: 8.1 G/DL (ref 6.4–8.3)
RBC # BLD AUTO: 5.26 10E6/UL (ref 3.8–5.2)
RBC URINE: 3 /HPF
SODIUM SERPL-SCNC: 133 MMOL/L (ref 135–145)
SP GR UR STRIP: 1.02 (ref 1–1.03)
SQUAMOUS EPITHELIAL: 4 /HPF
TROPONIN T SERPL HS-MCNC: <6 NG/L
UROBILINOGEN UR STRIP-MCNC: NORMAL MG/DL
WBC # BLD AUTO: 6.8 10E3/UL (ref 4–11)
WBC URINE: 2 /HPF

## 2025-04-15 PROCEDURE — 85004 AUTOMATED DIFF WBC COUNT: CPT | Performed by: EMERGENCY MEDICINE

## 2025-04-15 PROCEDURE — 250N000013 HC RX MED GY IP 250 OP 250 PS 637: Performed by: EMERGENCY MEDICINE

## 2025-04-15 PROCEDURE — 85048 AUTOMATED LEUKOCYTE COUNT: CPT | Performed by: EMERGENCY MEDICINE

## 2025-04-15 PROCEDURE — 93005 ELECTROCARDIOGRAM TRACING: CPT

## 2025-04-15 PROCEDURE — 80048 BASIC METABOLIC PNL TOTAL CA: CPT | Performed by: EMERGENCY MEDICINE

## 2025-04-15 PROCEDURE — 72146 MRI CHEST SPINE W/O DYE: CPT

## 2025-04-15 PROCEDURE — 84484 ASSAY OF TROPONIN QUANT: CPT | Performed by: EMERGENCY MEDICINE

## 2025-04-15 PROCEDURE — 250N000011 HC RX IP 250 OP 636: Performed by: EMERGENCY MEDICINE

## 2025-04-15 PROCEDURE — 81001 URINALYSIS AUTO W/SCOPE: CPT | Performed by: EMERGENCY MEDICINE

## 2025-04-15 PROCEDURE — 71046 X-RAY EXAM CHEST 2 VIEWS: CPT

## 2025-04-15 PROCEDURE — 83690 ASSAY OF LIPASE: CPT | Performed by: EMERGENCY MEDICINE

## 2025-04-15 PROCEDURE — 87086 URINE CULTURE/COLONY COUNT: CPT | Performed by: EMERGENCY MEDICINE

## 2025-04-15 PROCEDURE — 99285 EMERGENCY DEPT VISIT HI MDM: CPT | Mod: 25

## 2025-04-15 PROCEDURE — 84155 ASSAY OF PROTEIN SERUM: CPT | Performed by: EMERGENCY MEDICINE

## 2025-04-15 PROCEDURE — 72141 MRI NECK SPINE W/O DYE: CPT

## 2025-04-15 PROCEDURE — 36415 COLL VENOUS BLD VENIPUNCTURE: CPT | Performed by: EMERGENCY MEDICINE

## 2025-04-15 RX ORDER — OXYCODONE HYDROCHLORIDE 5 MG/1
5 TABLET ORAL ONCE
Status: COMPLETED | OUTPATIENT
Start: 2025-04-15 | End: 2025-04-15

## 2025-04-15 RX ORDER — METHYLPREDNISOLONE 4 MG/1
TABLET ORAL
Qty: 21 TABLET | Refills: 0 | Status: SHIPPED | OUTPATIENT
Start: 2025-04-15

## 2025-04-15 RX ORDER — OXYCODONE HYDROCHLORIDE 5 MG/1
5 TABLET ORAL EVERY 6 HOURS PRN
Qty: 20 TABLET | Refills: 0 | Status: SHIPPED | OUTPATIENT
Start: 2025-04-15 | End: 2025-04-15

## 2025-04-15 RX ORDER — ONDANSETRON 4 MG/1
4 TABLET, ORALLY DISINTEGRATING ORAL ONCE
Status: COMPLETED | OUTPATIENT
Start: 2025-04-15 | End: 2025-04-15

## 2025-04-15 RX ORDER — OXYCODONE HYDROCHLORIDE 5 MG/1
5 TABLET ORAL EVERY 6 HOURS PRN
Qty: 20 TABLET | Refills: 0 | Status: SHIPPED | OUTPATIENT
Start: 2025-04-15

## 2025-04-15 RX ORDER — METHYLPREDNISOLONE 4 MG/1
TABLET ORAL
Qty: 21 TABLET | Refills: 0 | Status: SHIPPED | OUTPATIENT
Start: 2025-04-15 | End: 2025-04-15

## 2025-04-15 RX ADMIN — OXYCODONE HYDROCHLORIDE 5 MG: 5 TABLET ORAL at 15:22

## 2025-04-15 RX ADMIN — ONDANSETRON 4 MG: 4 TABLET, ORALLY DISINTEGRATING ORAL at 13:47

## 2025-04-15 ASSESSMENT — ACTIVITIES OF DAILY LIVING (ADL)
ADLS_ACUITY_SCORE: 41

## 2025-04-15 ASSESSMENT — COLUMBIA-SUICIDE SEVERITY RATING SCALE - C-SSRS
6. HAVE YOU EVER DONE ANYTHING, STARTED TO DO ANYTHING, OR PREPARED TO DO ANYTHING TO END YOUR LIFE?: NO
2. HAVE YOU ACTUALLY HAD ANY THOUGHTS OF KILLING YOURSELF IN THE PAST MONTH?: NO
1. IN THE PAST MONTH, HAVE YOU WISHED YOU WERE DEAD OR WISHED YOU COULD GO TO SLEEP AND NOT WAKE UP?: NO

## 2025-04-15 NOTE — ED PROVIDER NOTES
Emergency Department Note      History of Present Illness     Chief Complaint   Back Pain      HPI   Concepcion Schumacher is a 75 year old female who presents to the ED with back pain.  She was seen here on the seventh and had extensive workup including CT of the chest, abdomen, and pelvis.  There were incidental findings but no acute cause for the pain such as ureteral stone or aortic aneurysm.  Cardiac workup was negative.  The patient is here and says her symptoms actually been off and on for a month.  She describes a localized area in the mid thoracic area of her back to the left of midline, and this is the area of maximal pain.  It radiates around her chest wall onto the breast.  No exertional or pleuritic symptoms.  D-dimer was negative previously.  She says at times she feels a tingling in her left arm and leg but not affecting her face.  This is intermittent and usually when she is standing.  This is currently absent.  No speech difficulties or vision changes.  The tingling has been intermittent over the last month and does not involve any motor symptoms.  No trauma to the chest or heavy lifting or straining.  No dysuria.  She has not had any recent fever.  No rash or changes to the chest wall externally.    The patient was seen in urgent care today where reportedly she was writhing and vomiting although this is not the case now.    Independent Historian   A family member is present and contributes to the history.    Review of External Notes   Urgent care notes reviewed from earlier today with the patient was seen and referred into the ED.    Past Medical History     Medical History and Problem List   Past Medical History:   Diagnosis Date    Hyperparathyroidism     Mantoux: positive, treated     Thyroid disease        Medications   methylPREDNISolone (MEDROL DOSEPAK) 4 MG tablet therapy pack  oxyCODONE (ROXICODONE) 5 MG tablet  amLODIPine (NORVASC) 5 MG tablet  Calcium-Magnesium-Vitamin D (CALCIUM 500  PO)  omeprazole (PRILOSEC) 20 MG DR capsule  pravastatin (PRAVACHOL) 20 MG tablet        Surgical History   Past Surgical History:   Procedure Laterality Date    PARATHYROIDECTOMY  7/3/2012    Procedure: PARATHYROIDECTOMY;  Neck exploration with Excision parathyroid adenoma, with rapid PTH assay, preoperative sestimeby injection;  Surgeon: Jaclyn Sanchez MD;  Location:  OR       Physical Exam     Patient Vitals for the past 24 hrs:   BP Temp Temp src Pulse Resp SpO2 Weight   04/15/25 1348 (!) 172/96 -- -- -- -- -- --   04/15/25 1343 -- 97.9  F (36.6  C) Temporal 61 18 99 % --   04/15/25 1342 -- -- -- -- -- -- 99.5 kg (219 lb 5.7 oz)     Physical Exam  Constitutional:       General: She is not in acute distress.     Appearance: Normal appearance. She is not diaphoretic.   HENT:      Head: Atraumatic.      Mouth/Throat:      Mouth: Mucous membranes are moist.   Eyes:      General: No scleral icterus.     Conjunctiva/sclera: Conjunctivae normal.   Cardiovascular:      Rate and Rhythm: Normal rate and regular rhythm.      Heart sounds: Normal heart sounds.   Pulmonary:      Effort: No respiratory distress.      Breath sounds: Normal breath sounds.   Abdominal:      General: Abdomen is flat. There is no distension.      Tenderness: There is no abdominal tenderness.   Musculoskeletal:      Cervical back: Neck supple.      Right lower leg: No edema.      Left lower leg: No edema.      Comments: No tenderness in the C-spine or lumbar spine.  There is mild tenderness over the mid lumbar left paraspinal muscles.  No midline step-off.   Skin:     General: Skin is warm.      Capillary Refill: Capillary refill takes less than 2 seconds.      Comments: No rash or vesicles over the chest wall.   Neurological:      General: No focal deficit present.      Mental Status: She is alert.      Comments: Gait is normal.  Strength 5 out of 5 nuchal bilaterally in the hands.  Flexion and extension at the elbow as well as abduction and  abduction of the shoulder is 5 out of 5.  Flexion at the hip 5 out of 5.  Gait is normal.  No facial asymmetry.  Speech is fluent.  Sensation light touch intact and symmetric at the face, arms, legs.   Psychiatric:         Mood and Affect: Mood normal.         Behavior: Behavior normal.           Diagnostics     Lab Results   Labs Ordered and Resulted from Time of ED Arrival to Time of ED Departure   URINE MACROSCOPIC WITH REFLEX TO MICRO - Abnormal       Result Value    Color Urine Light Yellow      Appearance Urine Clear      Glucose Urine Negative      Bilirubin Urine Negative      Ketones Urine Negative      Specific Gravity Urine 1.017      Blood Urine Negative      pH Urine 7.0      Protein Albumin Urine 20 (*)     Urobilinogen Urine Normal      Nitrite Urine Negative      Leukocyte Esterase Urine Trace (*)     Bacteria Urine Few (*)     RBC Urine 3 (*)     WBC Urine 2      Squamous Epithelials Urine 4 (*)     Mucus Urine Present (*)    BASIC METABOLIC PANEL - Abnormal    Sodium 133 (*)     Potassium 3.8      Chloride 93 (*)     Carbon Dioxide (CO2) 26      Anion Gap 14      Urea Nitrogen 5.7 (*)     Creatinine 0.69      GFR Estimate >90      Calcium 9.2      Glucose 101 (*)    CBC WITH PLATELETS AND DIFFERENTIAL - Abnormal    WBC Count 6.8      RBC Count 5.26 (*)     Hemoglobin 14.5      Hematocrit 43.6      MCV 83      MCH 27.6      MCHC 33.3      RDW 13.6      Platelet Count 244      % Neutrophils 63      % Lymphocytes 29      % Monocytes 7      % Eosinophils 0      % Basophils 0      % Immature Granulocytes 1      NRBCs per 100 WBC 0      Absolute Neutrophils 4.3      Absolute Lymphocytes 1.9      Absolute Monocytes 0.5      Absolute Eosinophils 0.0      Absolute Basophils 0.0      Absolute Immature Granulocytes 0.1      Absolute NRBCs 0.0     HEPATIC FUNCTION PANEL - Abnormal    Protein Total 8.1      Albumin 3.9      Bilirubin Total 0.8      Alkaline Phosphatase 103       (*)      (*)      Bilirubin Direct 0.20     LIPASE - Normal    Lipase 44     TROPONIN T, HIGH SENSITIVITY - Normal    Troponin T, High Sensitivity <6     URINE CULTURE       Imaging   MR Thoracic Spine w/o Contrast   Final Result   IMPRESSION: Mildly limited study due to patient motion. There is normal alignment of the thoracic vertebrae. Vertebral body heights of the thoracic spine are normal. Marrow signal within normal limits. No evidence for fracture or pathologic bony lesions.    No significant posterior disc bulges or herniations. Normal appearing thoracic spinal cord. No spinal canal or neural foraminal stenosis at any level of the thoracic spine.      Cervical spine MRI w/o contrast   Final Result   IMPRESSION:   1.  Mild cervical spondylosis without high-grade central stenosis.   2.  At C4-5, moderate right foraminal stenosis.   3.  At C5-6, mild to moderate right foraminal stenosis.   4.  At C6-7, moderate left foraminal stenosis.            XR Chest 2 Views   Final Result   IMPRESSION: Negative chest.          EKG   ECG results from 04/15/25   EKG 12-lead, tracing only     Value    Systolic Blood Pressure     Diastolic Blood Pressure     Ventricular Rate 57    Atrial Rate 57    ID Interval 168    QRS Duration 80        QTc 422    P Axis 14    R AXIS 49    T Axis 54    Interpretation ECG      Sinus bradycardia  Nonspecific T wave abnormality  Abnormal ECG  When compared with ECG of 07-Apr-2025 10:35,  QT has lengthened          Independent Interpretation   Chest x-ray independently interpreted.  No pneumothorax.    ED Course      Medications Administered   Medications   ondansetron (ZOFRAN ODT) ODT tab 4 mg (4 mg Oral $Given 4/15/25 1347)   oxyCODONE (ROXICODONE) tablet 5 mg (5 mg Oral $Given 4/15/25 1522)       Medical Decision Making / Diagnosis     [facility]   Concepcion Schumacher is a 75 year old female who presents to the ED with persistent radicular pain along the left side of the chest and while she has been waiting here at  times she has felt that even on the left side of the neck.  Part of her workup from previously was repeated.  Chest x-ray without infiltrate, pneumothorax, or effusion.  Urine not consistent with pyonephritis.  Cardiac workup is negative.  D-dimer previously was negative.  She did undergo MRI given the radicular component.  She has degenerative cervical changes which may be accounting for her symptoms.  She will be referred to the spine clinic.  She will have a Medrol Dosepak and oxycodone for home use.    We did discuss the elevation of the transaminases although this is improved from previously.  She does not use alcohol.  She may have fatty liver related to her diet.  She says she does not use much Tylenol.  She will continue to follow this her primary care clinic.    Disposition   The patient was discharged.     Diagnosis     ICD-10-CM    1. Cervical radiculopathy  M54.12       2. Elevated liver function tests  R79.89             Bayron Luong MD  04/15/25 2121

## 2025-04-15 NOTE — ED TRIAGE NOTES
Pt here with son. Pt c/o intermittent L sided thoracic pain that radiates to L flank x 1 week. C/o n/v today. No dysuria or hematuria. ABC intact.

## 2025-04-15 NOTE — DISCHARGE INSTRUCTIONS
Return to the ER for new or worsening symptoms.    Do not drive, use machinery, use alcohol, or use of sedating medications while taking oxycodone.    Your liver tests are elevated but have improved from the last time they were checked.  You should have this rechecked through your primary care clinic in 1 to 2 weeks.

## 2025-04-16 ENCOUNTER — PATIENT OUTREACH (OUTPATIENT)
Dept: GASTROENTEROLOGY | Facility: CLINIC | Age: 75
End: 2025-04-16
Payer: COMMERCIAL

## 2025-04-16 DIAGNOSIS — Z12.11 SPECIAL SCREENING FOR MALIGNANT NEOPLASM OF COLON: Primary | ICD-10-CM

## 2025-04-16 LAB
ATRIAL RATE - MUSE: 57 BPM
BACTERIA UR CULT: NORMAL
DIASTOLIC BLOOD PRESSURE - MUSE: NORMAL MMHG
INTERPRETATION ECG - MUSE: NORMAL
P AXIS - MUSE: 14 DEGREES
PR INTERVAL - MUSE: 168 MS
QRS DURATION - MUSE: 80 MS
QT - MUSE: 434 MS
QTC - MUSE: 422 MS
R AXIS - MUSE: 49 DEGREES
SYSTOLIC BLOOD PRESSURE - MUSE: NORMAL MMHG
T AXIS - MUSE: 54 DEGREES
VENTRICULAR RATE- MUSE: 57 BPM

## 2025-04-16 NOTE — LETTER
2025      Concepcion Schumacher  [address] [place] [address]              Dear Concepcion,    We hope this letter finds you doing well.     Your health is important to us at [facility]. Our records indicate that you could be due, or possibly overdue, for a screening or surveillance colonoscopy if you have not had a colonoscopy since .     An order has been placed for you to have this completed. Our scheduling team will be reaching out to you to assist in getting this scheduled. If you do not hear from them within 7 days or you would like to schedule sooner, please call [facility] [phone] option 1, Monday-Friday 7:00 am to 5:00 pm    If you believe this is in error and have already had a colonoscopy done, please have your results and recommendations faxed to [phone].    If you have questions about this order or have further concerns, please reach out to your primary care provider.     Marcelino     Colorectal Cancer RN Screening Team  [facility]

## 2025-04-16 NOTE — PROGRESS NOTES
"CRC Screening Colonoscopy Referral Review    Patient meets the inclusion criteria for screening colonoscopy standing order.    Ordering/Referring Provider:  Diana Phillips      BMI: Estimated body mass index is 34.36 kg/m  as calculated from the following:    Height as of 4/7/25: 1.702 m (5' 7\").    Weight as of 4/15/25: 99.5 kg (219 lb 5.7 oz).     Sedation:  Does patient have any of the following conditions affecting sedation?  No medical conditions affecting sedation.    Previous Scopes:  Any previous recommendations or follow up needs based on previous scope?  na / No recommendations.    Medical Concerns to Postpone Order:  Does patient have any of the following medical concerns that should postpone/delay colonoscopy referral?  No medical conditions affecting colonoscopy referral.    Final Referral Details:  Based on patient's medical history patient is appropriate for referral order with moderate sedation. If patient's BMI > 50 do not schedule in ASC.  "

## 2025-04-17 ENCOUNTER — PATIENT OUTREACH (OUTPATIENT)
Dept: CARE COORDINATION | Facility: CLINIC | Age: 75
End: 2025-04-17
Payer: COMMERCIAL

## 2025-04-17 DIAGNOSIS — I10 ESSENTIAL HYPERTENSION: ICD-10-CM

## 2025-04-17 RX ORDER — AMLODIPINE BESYLATE 5 MG/1
5 TABLET ORAL 2 TIMES DAILY
Qty: 180 TABLET | Refills: 0 | Status: SHIPPED | OUTPATIENT
Start: 2025-04-17

## 2025-04-17 NOTE — TELEPHONE ENCOUNTER
Spoke with patient's son.  Advised of primary care provider's message below.    He said he tried scheduling a follow up appointment but [facility] does not accept her insurance - [facility] [place].    Advised son to check with insurance to see which clinic is covered and schedule an appointment for a follow up HTN.

## 2025-04-17 NOTE — PROGRESS NOTES
Clinic Care Coordination Contact  [facility]/Voicemail    Clinical Data: Care Coordinator Outreach    Outreach Documentation Number of Outreach Attempt   4/17/2025  12:10 PM 2       Left message on patient's voicemail with call back information and requested return call.     Care Coordinator will do no further outreaches at this time.    Rosaura Schaffer  Care   Connected Care Resources   [facility]     *Connected Care Resources Team does NOT follow patient ongoing. Referrals are identified based on internal discharge report and the outreach is to ensure Patient has understanding of their discharge instructions.

## 2025-04-17 NOTE — TELEPHONE ENCOUNTER
I have not seen for a year- and she was seen in the emergency room and told to follow-up because of elevated blood pressure  Needs appointment for ongoing refills

## 2025-07-13 DIAGNOSIS — I10 ESSENTIAL HYPERTENSION: ICD-10-CM

## 2025-07-14 RX ORDER — AMLODIPINE BESYLATE 5 MG/1
5 TABLET ORAL 2 TIMES DAILY
Qty: 180 TABLET | Refills: 0 | OUTPATIENT
Start: 2025-07-14